# Patient Record
Sex: FEMALE | Race: WHITE | NOT HISPANIC OR LATINO | Employment: OTHER | URBAN - METROPOLITAN AREA
[De-identification: names, ages, dates, MRNs, and addresses within clinical notes are randomized per-mention and may not be internally consistent; named-entity substitution may affect disease eponyms.]

---

## 2023-04-21 LAB
ALANINE AMINOTRANSFERASE (SGPT) (U/L) IN SER/PLAS: 23 U/L (ref 7–45)
ALBUMIN (G/DL) IN SER/PLAS: 4.4 G/DL (ref 3.4–5)
ALKALINE PHOSPHATASE (U/L) IN SER/PLAS: 67 U/L (ref 33–136)
ANION GAP IN SER/PLAS: 12 MMOL/L (ref 10–20)
ASPARTATE AMINOTRANSFERASE (SGOT) (U/L) IN SER/PLAS: 21 U/L (ref 9–39)
BILIRUBIN TOTAL (MG/DL) IN SER/PLAS: 0.9 MG/DL (ref 0–1.2)
CALCIUM (MG/DL) IN SER/PLAS: 9.9 MG/DL (ref 8.6–10.6)
CARBON DIOXIDE, TOTAL (MMOL/L) IN SER/PLAS: 26 MMOL/L (ref 21–32)
CHLORIDE (MMOL/L) IN SER/PLAS: 106 MMOL/L (ref 98–107)
CREATININE (MG/DL) IN SER/PLAS: 0.91 MG/DL (ref 0.5–1.05)
ESTIMATED AVERAGE GLUCOSE FOR HBA1C: 143 MG/DL
GFR FEMALE: 64 ML/MIN/1.73M2
GLUCOSE (MG/DL) IN SER/PLAS: 152 MG/DL (ref 74–99)
HEMOGLOBIN A1C/HEMOGLOBIN TOTAL IN BLOOD: 6.6 %
POTASSIUM (MMOL/L) IN SER/PLAS: 4.6 MMOL/L (ref 3.5–5.3)
PROTEIN TOTAL: 7.2 G/DL (ref 6.4–8.2)
SODIUM (MMOL/L) IN SER/PLAS: 139 MMOL/L (ref 136–145)
UREA NITROGEN (MG/DL) IN SER/PLAS: 23 MG/DL (ref 6–23)

## 2023-09-07 LAB
ALANINE AMINOTRANSFERASE (SGPT) (U/L) IN SER/PLAS: 22 U/L (ref 7–45)
ALBUMIN (G/DL) IN SER/PLAS: 4.4 G/DL (ref 3.4–5)
ALKALINE PHOSPHATASE (U/L) IN SER/PLAS: 65 U/L (ref 33–136)
ANION GAP IN SER/PLAS: 13 MMOL/L (ref 10–20)
ASPARTATE AMINOTRANSFERASE (SGOT) (U/L) IN SER/PLAS: 20 U/L (ref 9–39)
BASOPHILS (10*3/UL) IN BLOOD BY AUTOMATED COUNT: 0.04 X10E9/L (ref 0–0.1)
BASOPHILS/100 LEUKOCYTES IN BLOOD BY AUTOMATED COUNT: 0.4 % (ref 0–2)
BILIRUBIN TOTAL (MG/DL) IN SER/PLAS: 0.9 MG/DL (ref 0–1.2)
CALCIUM (MG/DL) IN SER/PLAS: 9.9 MG/DL (ref 8.6–10.6)
CARBON DIOXIDE, TOTAL (MMOL/L) IN SER/PLAS: 26 MMOL/L (ref 21–32)
CHLORIDE (MMOL/L) IN SER/PLAS: 104 MMOL/L (ref 98–107)
CREATININE (MG/DL) IN SER/PLAS: 0.83 MG/DL (ref 0.5–1.05)
EOSINOPHILS (10*3/UL) IN BLOOD BY AUTOMATED COUNT: 0.23 X10E9/L (ref 0–0.4)
EOSINOPHILS/100 LEUKOCYTES IN BLOOD BY AUTOMATED COUNT: 2.3 % (ref 0–6)
ERYTHROCYTE DISTRIBUTION WIDTH (RATIO) BY AUTOMATED COUNT: 12.5 % (ref 11.5–14.5)
ERYTHROCYTE MEAN CORPUSCULAR HEMOGLOBIN CONCENTRATION (G/DL) BY AUTOMATED: 30.9 G/DL (ref 32–36)
ERYTHROCYTE MEAN CORPUSCULAR VOLUME (FL) BY AUTOMATED COUNT: 92 FL (ref 80–100)
ERYTHROCYTES (10*6/UL) IN BLOOD BY AUTOMATED COUNT: 4.9 X10E12/L (ref 4–5.2)
ESTIMATED AVERAGE GLUCOSE FOR HBA1C: 143 MG/DL
GFR FEMALE: 71 ML/MIN/1.73M2
GLUCOSE (MG/DL) IN SER/PLAS: 109 MG/DL (ref 74–99)
HEMATOCRIT (%) IN BLOOD BY AUTOMATED COUNT: 45 % (ref 36–46)
HEMOGLOBIN (G/DL) IN BLOOD: 13.9 G/DL (ref 12–16)
HEMOGLOBIN A1C/HEMOGLOBIN TOTAL IN BLOOD: 6.6 %
IMMATURE GRANULOCYTES/100 LEUKOCYTES IN BLOOD BY AUTOMATED COUNT: 0.3 % (ref 0–0.9)
LEUKOCYTES (10*3/UL) IN BLOOD BY AUTOMATED COUNT: 10 X10E9/L (ref 4.4–11.3)
LYMPHOCYTES (10*3/UL) IN BLOOD BY AUTOMATED COUNT: 2.1 X10E9/L (ref 0.8–3)
LYMPHOCYTES/100 LEUKOCYTES IN BLOOD BY AUTOMATED COUNT: 21 % (ref 13–44)
MONOCYTES (10*3/UL) IN BLOOD BY AUTOMATED COUNT: 0.77 X10E9/L (ref 0.05–0.8)
MONOCYTES/100 LEUKOCYTES IN BLOOD BY AUTOMATED COUNT: 7.7 % (ref 2–10)
NEUTROPHILS (10*3/UL) IN BLOOD BY AUTOMATED COUNT: 6.82 X10E9/L (ref 1.6–5.5)
NEUTROPHILS/100 LEUKOCYTES IN BLOOD BY AUTOMATED COUNT: 68.3 % (ref 40–80)
NRBC (PER 100 WBCS) BY AUTOMATED COUNT: 0 /100 WBC (ref 0–0)
PLATELETS (10*3/UL) IN BLOOD AUTOMATED COUNT: 261 X10E9/L (ref 150–450)
POTASSIUM (MMOL/L) IN SER/PLAS: 4.3 MMOL/L (ref 3.5–5.3)
PROTEIN TOTAL: 7.5 G/DL (ref 6.4–8.2)
SODIUM (MMOL/L) IN SER/PLAS: 139 MMOL/L (ref 136–145)
THYROTROPIN (MIU/L) IN SER/PLAS BY DETECTION LIMIT <= 0.05 MIU/L: 8.49 MIU/L (ref 0.44–3.98)
THYROXINE (T4) FREE (NG/DL) IN SER/PLAS: 0.75 NG/DL (ref 0.78–1.48)
UREA NITROGEN (MG/DL) IN SER/PLAS: 19 MG/DL (ref 6–23)

## 2023-09-11 PROBLEM — R93.89 ABNORMAL FINDING ON IMAGING: Status: ACTIVE | Noted: 2023-09-11

## 2023-09-11 PROBLEM — L82.0 INFLAMED SEBORRHEIC KERATOSIS: Status: ACTIVE | Noted: 2022-12-08

## 2023-09-11 PROBLEM — L98.9 CHANGING SKIN LESION: Status: ACTIVE | Noted: 2023-09-11

## 2023-09-11 PROBLEM — E78.00 ELEVATED LDL CHOLESTEROL LEVEL: Status: ACTIVE | Noted: 2023-09-11

## 2023-09-11 PROBLEM — D22.39 MELANOCYTIC NEVI OF OTHER PARTS OF FACE: Status: ACTIVE | Noted: 2022-12-08

## 2023-09-11 PROBLEM — I10 HYPERTENSION: Status: ACTIVE | Noted: 2023-09-11

## 2023-09-11 PROBLEM — R42 WEAKNESS WITH DIZZINESS: Status: ACTIVE | Noted: 2023-09-11

## 2023-09-11 PROBLEM — R09.89 CAROTID ARTERY BRUIT: Status: ACTIVE | Noted: 2023-09-11

## 2023-09-11 PROBLEM — N95.1 POSTMENOPAUSAL DISORDER: Status: ACTIVE | Noted: 2023-09-11

## 2023-09-11 PROBLEM — R00.1 BRADYCARDIA: Status: ACTIVE | Noted: 2023-09-11

## 2023-09-11 PROBLEM — L60.8 CHANGE IN NAIL APPEARANCE: Status: ACTIVE | Noted: 2023-09-11

## 2023-09-11 PROBLEM — R53.1 WEAKNESS WITH DIZZINESS: Status: ACTIVE | Noted: 2023-09-11

## 2023-09-11 PROBLEM — R79.9 ABNORMAL BLOOD CHEMISTRY: Status: ACTIVE | Noted: 2023-09-11

## 2023-09-11 PROBLEM — E03.9 HYPOTHYROIDISM: Status: ACTIVE | Noted: 2023-09-11

## 2023-09-11 PROBLEM — R42 DIZZINESS: Status: ACTIVE | Noted: 2023-09-11

## 2023-09-11 PROBLEM — I65.22 ASYMPTOMATIC STENOSIS OF LEFT CAROTID ARTERY WITHOUT INFARCTION: Status: ACTIVE | Noted: 2023-09-11

## 2023-09-11 PROBLEM — D48.5 NEOPLASM OF UNCERTAIN BEHAVIOR OF SKIN: Status: ACTIVE | Noted: 2022-12-08

## 2023-09-11 PROBLEM — D18.01 HEMANGIOMA OF SKIN AND SUBCUTANEOUS TISSUE: Status: ACTIVE | Noted: 2022-12-08

## 2023-09-11 PROBLEM — R20.2 HAND TINGLING: Status: ACTIVE | Noted: 2023-09-11

## 2023-09-11 PROBLEM — D22.5 MELANOCYTIC NEVI OF TRUNK: Status: ACTIVE | Noted: 2022-12-08

## 2023-09-11 PROBLEM — L72.9 SKIN CYST: Status: ACTIVE | Noted: 2023-09-11

## 2023-09-11 PROBLEM — J32.9 SINUSITIS: Status: ACTIVE | Noted: 2023-09-11

## 2023-09-11 PROBLEM — R92.8 ABNORMAL MAMMOGRAM: Status: ACTIVE | Noted: 2023-09-11

## 2023-09-11 PROBLEM — E66.3 OVERWEIGHT WITH BODY MASS INDEX (BMI) OF 25 TO 25.9 IN ADULT: Status: ACTIVE | Noted: 2023-09-11

## 2023-09-11 PROBLEM — D22.4 MELANOCYTIC NEVI OF SCALP AND NECK: Status: ACTIVE | Noted: 2022-12-08

## 2023-09-11 PROBLEM — E55.9 VITAMIN D DEFICIENCY: Status: ACTIVE | Noted: 2023-09-11

## 2023-09-11 PROBLEM — Z78.0 ASYMPTOMATIC MENOPAUSAL STATE: Status: ACTIVE | Noted: 2023-09-11

## 2023-09-11 PROBLEM — L81.4 OTHER MELANIN HYPERPIGMENTATION: Status: ACTIVE | Noted: 2022-12-08

## 2023-09-11 PROBLEM — E05.90 HYPERTHYROIDISM: Status: ACTIVE | Noted: 2023-09-11

## 2023-09-11 PROBLEM — L82.1 OTHER SEBORRHEIC KERATOSIS: Status: ACTIVE | Noted: 2022-12-08

## 2023-09-11 RX ORDER — ROSUVASTATIN CALCIUM 20 MG/1
1 TABLET, COATED ORAL DAILY
COMMUNITY
Start: 2020-07-13 | End: 2024-05-01

## 2023-09-11 RX ORDER — LOSARTAN POTASSIUM 100 MG/1
1 TABLET ORAL DAILY
COMMUNITY
Start: 2019-11-18 | End: 2024-05-01

## 2023-09-11 RX ORDER — AMLODIPINE BESYLATE 5 MG/1
1 TABLET ORAL DAILY
COMMUNITY
Start: 2019-09-09 | End: 2024-05-01

## 2023-09-11 RX ORDER — THYROID 60 MG/1
1 TABLET ORAL DAILY
COMMUNITY
Start: 2013-01-17 | End: 2024-05-03

## 2023-09-11 RX ORDER — NAPROXEN SODIUM 220 MG/1
1 TABLET, FILM COATED ORAL EVERY OTHER DAY
COMMUNITY
Start: 2020-07-13

## 2023-09-11 RX ORDER — ERGOCALCIFEROL 1.25 MG/1
1 CAPSULE ORAL
COMMUNITY
Start: 2021-01-08 | End: 2024-05-06

## 2023-09-11 RX ORDER — ASCORBIC ACID 500 MG
TABLET,CHEWABLE ORAL
COMMUNITY
Start: 2022-09-15

## 2023-09-11 RX ORDER — HYDROCHLOROTHIAZIDE 25 MG/1
1 TABLET ORAL DAILY
COMMUNITY
Start: 2019-11-18 | End: 2024-05-01

## 2023-10-23 ENCOUNTER — HOSPITAL ENCOUNTER (OUTPATIENT)
Dept: VASCULAR MEDICINE | Facility: HOSPITAL | Age: 78
Discharge: HOME | End: 2023-10-23
Payer: MEDICARE

## 2023-10-23 DIAGNOSIS — R09.89 OTHER SPECIFIED SYMPTOMS AND SIGNS INVOLVING THE CIRCULATORY AND RESPIRATORY SYSTEMS: ICD-10-CM

## 2023-10-23 PROCEDURE — 93880 EXTRACRANIAL BILAT STUDY: CPT

## 2023-10-23 PROCEDURE — 93880 EXTRACRANIAL BILAT STUDY: CPT | Performed by: INTERNAL MEDICINE

## 2023-10-27 ENCOUNTER — APPOINTMENT (OUTPATIENT)
Dept: DERMATOLOGY | Facility: CLINIC | Age: 78
End: 2023-10-27
Payer: MEDICARE

## 2023-11-01 NOTE — PROGRESS NOTES
"Subjective   HPI: Alda Avila is a 78 y.o. female is here for evaluation and treatment of a skin lesion on the ulnar side of the right wrist as well as a pruritic lesion on the chest.  She was last seen by Dr. Brandt on 12/8/2022.  Patient states she had a cystic-like lesion on the right side of her wrist for about 6 months.  The area did not go away.  She went and saw her PCP who said it may have been a cyst and so they tried draining it.  The cyst came back and the patient was referred to orthopedic surgery.  The orthopedic surgeon said it was likely a hair follicle as it did not show up on x-ray and referred her back to dermatology.  Patient says the orthopedic surgeon pushed on the area and the area actually popped and went away.  Now she is left with a hyperpigmented 2 mm lesion and wants to know what it was.  The lesion on her chest has been present for about a month and has been pruritic causing her to itch which then causes the area to bleed.    ROS: No other skin or systemic complaints other than what is documented elsewhere in the note.    ALLERGIES: Patient has no known allergies.    SOCIAL:  has no history on file for tobacco use, alcohol use, and drug use.    Objective   Right Wrist - ulnar side  2 mm hyperpigmented area on the ulnar side of the wrist    Right Breast  Pink and brown \"stuck on\" verrucous scaly papule with surrounding erythema and bloody crust.        Assessment/Plan   1. Epidermal cyst  Right Wrist - ulnar side    Punch biopsy to rule out foreign body versus epidermal inclusion cyst.  Discussed with patient patient is agreeable.    Skin biopsy - Right Wrist - ulnar side    Specimen 1 - Dermatopathology- DERM LAB  Differential Diagnosis: epidermal cyst vs foreign body  Check Margins Yes/No?:  yes  Comments:    Dermpath Lab: Routine Histopathology (formalin-fixed tissue)    2. Seborrheic keratosis  Right Breast    Discussed benign nature of seborrheic keratosis with patient since " the area is pruritic and inflamed discussed with patient option to remove.  Patient would like removal at this time.    Skin biopsy - Right Breast    Specimen 2 - Dermatopathology- DERM LAB  Differential Diagnosis: isk vs scc  Check Margins Yes/No?:  no  Comments:    Dermpath Lab: Routine Histopathology (formalin-fixed tissue)         FOLLOW UP: 7 days suture removal    The patient was encouraged to contact me with any further questions or concerns.  Muriel Cuevas PA-C  11/6/2023

## 2023-11-06 ENCOUNTER — OFFICE VISIT (OUTPATIENT)
Dept: DERMATOLOGY | Facility: CLINIC | Age: 78
End: 2023-11-06
Payer: MEDICARE

## 2023-11-06 DIAGNOSIS — L72.0 EPIDERMAL CYST: Primary | ICD-10-CM

## 2023-11-06 DIAGNOSIS — L82.1 SEBORRHEIC KERATOSIS: ICD-10-CM

## 2023-11-06 PROCEDURE — 1126F AMNT PAIN NOTED NONE PRSNT: CPT

## 2023-11-06 PROCEDURE — 99214 OFFICE O/P EST MOD 30 MIN: CPT

## 2023-11-06 PROCEDURE — 88305 TISSUE EXAM BY PATHOLOGIST: CPT | Mod: TC,DER

## 2023-11-06 PROCEDURE — 3078F DIAST BP <80 MM HG: CPT

## 2023-11-06 PROCEDURE — 11104 PUNCH BX SKIN SINGLE LESION: CPT

## 2023-11-06 PROCEDURE — 11301 SHAVE SKIN LESION 0.6-1.0 CM: CPT

## 2023-11-06 PROCEDURE — 3074F SYST BP LT 130 MM HG: CPT

## 2023-11-06 PROCEDURE — 88305 TISSUE EXAM BY PATHOLOGIST: CPT | Performed by: DERMATOLOGY

## 2023-11-07 ENCOUNTER — OFFICE VISIT (OUTPATIENT)
Dept: VASCULAR SURGERY | Facility: CLINIC | Age: 78
End: 2023-11-07
Payer: MEDICARE

## 2023-11-07 VITALS
HEART RATE: 65 BPM | BODY MASS INDEX: 24.98 KG/M2 | DIASTOLIC BLOOD PRESSURE: 82 MMHG | WEIGHT: 141 LBS | SYSTOLIC BLOOD PRESSURE: 163 MMHG

## 2023-11-07 DIAGNOSIS — I65.23 BILATERAL CAROTID ARTERY STENOSIS: Primary | ICD-10-CM

## 2023-11-07 PROCEDURE — 1126F AMNT PAIN NOTED NONE PRSNT: CPT | Performed by: SURGERY

## 2023-11-07 PROCEDURE — 1036F TOBACCO NON-USER: CPT | Performed by: SURGERY

## 2023-11-07 PROCEDURE — 99213 OFFICE O/P EST LOW 20 MIN: CPT | Performed by: SURGERY

## 2023-11-07 PROCEDURE — 1159F MED LIST DOCD IN RCRD: CPT | Performed by: SURGERY

## 2023-11-07 PROCEDURE — 3077F SYST BP >= 140 MM HG: CPT | Performed by: SURGERY

## 2023-11-07 PROCEDURE — 3079F DIAST BP 80-89 MM HG: CPT | Performed by: SURGERY

## 2023-11-07 ASSESSMENT — ENCOUNTER SYMPTOMS
RESPIRATORY NEGATIVE: 1
GASTROINTESTINAL NEGATIVE: 1
PSYCHIATRIC NEGATIVE: 1
MUSCULOSKELETAL NEGATIVE: 1
HEMATOLOGIC/LYMPHATIC NEGATIVE: 1
CARDIOVASCULAR NEGATIVE: 1
EYES NEGATIVE: 1
NEUROLOGICAL NEGATIVE: 1
ENDOCRINE NEGATIVE: 1
ALLERGIC/IMMUNOLOGIC NEGATIVE: 1
CONSTITUTIONAL NEGATIVE: 1

## 2023-11-07 NOTE — PROGRESS NOTES
Subjective   Patient ID: Alda Avila is a 78 y.o. female who presents for Follow-up (1 yr Carotid Bruit).  HPI  78 year old female with a past medical history of carotid artery stenosis, HTN and obesity that presents to the office for a yearly follow up for carotid artery stenosis She remains asymptomatic, she denies dizziness, vision changes or limb weakness. She is on ASA and a statin.She denies smoking. She states that her mother had a history of carotid artery surgeries.    Vascular testing:  10/23: Carotid artery ultrasound: Findings are consistent with less than 50% stenosis of the right proximal internal carotid artery. Findings are consistent with less than 50% stenosis of the left proximal internal carotid artery.    2/22: Carotid ultrasound: Left Carotid: Findings are consistent with greater than 70% stenosis of the left promal ICA.  July/21: Right Carotid: Findings are consistent with less than 50% stenosis of the right proximal ICA.  Left Carotid: Findings are consistent with greater than 70% stenosis of the left proximal ICA.    CT angio neck July/2021:Estimated 60-65% stenosis of the left internal carotid artery origin secondary to calcified and noncalcified atheromatous plaque.Estimated 30-35% stenosis of the right internal carotid artery origin.    Review of Systems   Constitutional: Negative.    HENT: Negative.     Eyes: Negative.    Respiratory: Negative.     Cardiovascular: Negative.    Gastrointestinal: Negative.    Endocrine: Negative.    Genitourinary: Negative.    Musculoskeletal: Negative.    Skin: Negative.    Allergic/Immunologic: Negative.    Neurological: Negative.    Hematological: Negative.    Psychiatric/Behavioral: Negative.         Objective   Physical Exam  Eyes:      Pupils: Pupils are equal, round, and reactive to light.   Cardiovascular:      Rate and Rhythm: Normal rate.   Pulmonary:      Effort: Pulmonary effort is normal.   Abdominal:      General: Bowel sounds are  normal.   Musculoskeletal:         General: Normal range of motion.      Cervical back: Normal range of motion.   Skin:     General: Skin is warm and dry.      Capillary Refill: Capillary refill takes less than 2 seconds.   Neurological:      General: No focal deficit present.      Mental Status: She is alert.   Psychiatric:         Mood and Affect: Mood normal.         Assessment/Plan   78 year old female with a past medical history of carotid artery stenosis, HTN and obesity that presents to the office for a yearly follow up for carotid artery stenosis     Plan:  Carotid ultrasound shows less than 50%.  Continue ASA and statin  Dicussed stroke symptoms and when to seek medical attention  Follow up in 6 mths- will repeat carotid duplex at that time

## 2023-11-08 LAB
LABORATORY COMMENT REPORT: NORMAL
PATH REPORT.FINAL DX SPEC: NORMAL
PATH REPORT.GROSS SPEC: NORMAL
PATH REPORT.MICROSCOPIC SPEC OTHER STN: NORMAL
PATH REPORT.RELEVANT HX SPEC: NORMAL
PATH REPORT.TOTAL CANCER: NORMAL

## 2023-11-13 ENCOUNTER — OFFICE VISIT (OUTPATIENT)
Dept: DERMATOLOGY | Facility: CLINIC | Age: 78
End: 2023-11-13
Payer: MEDICARE

## 2023-11-13 DIAGNOSIS — L82.1 OTHER SEBORRHEIC KERATOSIS: ICD-10-CM

## 2023-11-13 DIAGNOSIS — Z48.02 ENCOUNTER FOR REMOVAL OF SUTURES: Primary | ICD-10-CM

## 2023-11-13 PROCEDURE — 1159F MED LIST DOCD IN RCRD: CPT

## 2023-11-13 PROCEDURE — 3077F SYST BP >= 140 MM HG: CPT

## 2023-11-13 PROCEDURE — 1036F TOBACCO NON-USER: CPT

## 2023-11-13 PROCEDURE — 3079F DIAST BP 80-89 MM HG: CPT

## 2023-11-13 PROCEDURE — 99024 POSTOP FOLLOW-UP VISIT: CPT

## 2023-11-13 PROCEDURE — 1126F AMNT PAIN NOTED NONE PRSNT: CPT

## 2023-11-13 NOTE — PROGRESS NOTES
Subjective   HPI: Alda Avila is a 78 y.o. female is here for suture removal and dermatopathology results from 11/6/2023 where a punch biopsy was performed on the right wrist ulnar side.  Dermatopathology accession ID [id number].  Patient has no concerns at this time.    ROS: No other skin or systemic complaints other than what is documented elsewhere in the note.    ALLERGIES: Patient has no known allergies.    SOCIAL:  reports that she quit smoking about 20 years ago. Her smoking use included cigarettes. She has never been exposed to tobacco smoke. She has never used smokeless tobacco. No history on file for alcohol use and drug use.    Objective   Right Wrist - Anterior  The area has healed nicely.    [id number]      Assessment/Plan   1. Encounter for removal of sutures  Right Wrist - Anterior    A Steri-Strip was placed.  Discussed dermatopathology results with patient.    2. Other seborrheic keratosis  Right Breast         FOLLOW UP: As needed    The patient was encouraged to contact me with any further questions or concerns.  Muriel Cuevas PA-C  11/13/2023

## 2023-11-13 NOTE — PROGRESS NOTES
Subjective     Alda Avila is a 78 y.o. female who presents for the following: bx results ([id number] and Suture Removal /).     Review of Systems:  No other skin or systemic complaints other than what is documented elsewhere in the note.    The following portions of the chart were reviewed this encounter and updated as appropriate:          Skin Cancer History  No skin cancer on file.      Specialty Problems          Dermatology Problems    Hemangioma of skin and subcutaneous tissue    Inflamed seborrheic keratosis    Melanocytic nevi of other parts of face    Melanocytic nevi of scalp and neck    Melanocytic nevi of trunk    Neoplasm of uncertain behavior of skin    Other melanin hyperpigmentation    Other seborrheic keratosis    Change in nail appearance    Changing skin lesion    Skin cyst        Objective   Well appearing patient in no apparent distress; mood and affect are within normal limits.    A focused skin examination was performed. All findings within normal limits unless otherwise noted below.    Assessment/Plan   1. Other seborrheic keratosis  Right Breast

## 2023-12-21 ENCOUNTER — HOSPITAL ENCOUNTER (OUTPATIENT)
Dept: RADIOLOGY | Facility: HOSPITAL | Age: 78
Discharge: HOME | End: 2023-12-21
Payer: MEDICARE

## 2023-12-21 DIAGNOSIS — Z78.0 ASYMPTOMATIC MENOPAUSAL STATE: ICD-10-CM

## 2023-12-21 PROCEDURE — 77085 DXA BONE DENSITY AXL VRT FX: CPT

## 2023-12-21 PROCEDURE — 77085 DXA BONE DENSITY AXL VRT FX: CPT | Performed by: STUDENT IN AN ORGANIZED HEALTH CARE EDUCATION/TRAINING PROGRAM

## 2023-12-22 ENCOUNTER — TELEPHONE (OUTPATIENT)
Dept: PRIMARY CARE | Facility: CLINIC | Age: 78
End: 2023-12-22
Payer: MEDICARE

## 2023-12-22 NOTE — TELEPHONE ENCOUNTER
Called Pt several times.  No answer,  will keep trying.  Lg      ----- Message from Alistair Faith MD sent at 12/21/2023  6:16 PM EST -----  Pls let pt know I would like to consult rheumatology for the thinning of the bone Osteopenia .  Take it vitamin D and calcium ty aq

## 2023-12-22 NOTE — TELEPHONE ENCOUNTER
Called Pt several times, no answer.  Will keep trying.  Lg      ----- Message from Alistair Faith MD sent at 12/21/2023  6:16 PM EST -----  Pls let pt know I would like to consult rheumatology for the thinning of the bone Osteopenia .  Take it vitamin D and calcium ty aq

## 2023-12-26 ENCOUNTER — TELEPHONE (OUTPATIENT)
Dept: PRIMARY CARE | Facility: CLINIC | Age: 78
End: 2023-12-26
Payer: MEDICARE

## 2023-12-26 DIAGNOSIS — M85.80 OSTEOPENIA, UNSPECIFIED LOCATION: ICD-10-CM

## 2023-12-26 NOTE — TELEPHONE ENCOUNTER
Called pt and relayed info.  She is open to see rheumatology.  She is ok with Dr. Cool.  She will call back to make appt.  Please put in referral to rheumatology.  LG    ----- Message from Alistair Faith MD sent at 12/21/2023  6:16 PM EST -----  Pls let pt know I would like to consult rheumatology for the thinning of the bone Osteopenia .  Take it vitamin D and calcium ty aq

## 2024-03-01 ENCOUNTER — OFFICE VISIT (OUTPATIENT)
Dept: RHEUMATOLOGY | Facility: CLINIC | Age: 79
End: 2024-03-01
Payer: MEDICARE

## 2024-03-01 ENCOUNTER — LAB (OUTPATIENT)
Dept: LAB | Facility: LAB | Age: 79
End: 2024-03-01
Payer: MEDICARE

## 2024-03-01 VITALS — BODY MASS INDEX: 25.69 KG/M2 | SYSTOLIC BLOOD PRESSURE: 148 MMHG | WEIGHT: 145 LBS | DIASTOLIC BLOOD PRESSURE: 78 MMHG

## 2024-03-01 DIAGNOSIS — M85.80 OSTEOPENIA, UNSPECIFIED LOCATION: Primary | ICD-10-CM

## 2024-03-01 DIAGNOSIS — M85.80 OSTEOPENIA, UNSPECIFIED LOCATION: ICD-10-CM

## 2024-03-01 LAB
25(OH)D3 SERPL-MCNC: 56 NG/ML (ref 30–100)
CALCIUM SERPL-MCNC: 10.1 MG/DL (ref 8.6–10.6)
MAGNESIUM SERPL-MCNC: 2.01 MG/DL (ref 1.6–2.4)
PHOSPHATE SERPL-MCNC: 3 MG/DL (ref 2.5–4.9)
PTH-INTACT SERPL-MCNC: 39.2 PG/ML (ref 18.5–88)

## 2024-03-01 PROCEDURE — 1159F MED LIST DOCD IN RCRD: CPT | Performed by: INTERNAL MEDICINE

## 2024-03-01 PROCEDURE — 1126F AMNT PAIN NOTED NONE PRSNT: CPT | Performed by: INTERNAL MEDICINE

## 2024-03-01 PROCEDURE — 1036F TOBACCO NON-USER: CPT | Performed by: INTERNAL MEDICINE

## 2024-03-01 PROCEDURE — 82310 ASSAY OF CALCIUM: CPT

## 2024-03-01 PROCEDURE — 83735 ASSAY OF MAGNESIUM: CPT

## 2024-03-01 PROCEDURE — 99214 OFFICE O/P EST MOD 30 MIN: CPT | Performed by: INTERNAL MEDICINE

## 2024-03-01 PROCEDURE — 83970 ASSAY OF PARATHORMONE: CPT

## 2024-03-01 PROCEDURE — 82523 COLLAGEN CROSSLINKS: CPT

## 2024-03-01 PROCEDURE — 3077F SYST BP >= 140 MM HG: CPT | Performed by: INTERNAL MEDICINE

## 2024-03-01 PROCEDURE — 84100 ASSAY OF PHOSPHORUS: CPT

## 2024-03-01 PROCEDURE — 82306 VITAMIN D 25 HYDROXY: CPT

## 2024-03-01 PROCEDURE — 36415 COLL VENOUS BLD VENIPUNCTURE: CPT

## 2024-03-01 PROCEDURE — 3078F DIAST BP <80 MM HG: CPT | Performed by: INTERNAL MEDICINE

## 2024-03-01 RX ORDER — ALENDRONATE SODIUM 70 MG/1
70 TABLET ORAL
Qty: 12 TABLET | Refills: 1 | Status: SHIPPED | OUTPATIENT
Start: 2024-03-01 | End: 2024-05-10 | Stop reason: ALTCHOICE

## 2024-03-01 NOTE — PATIENT INSTRUCTIONS
Take alendronate once a week.  Continue calcium and vitamin D.  Continue weightbearing exercises.  Call if any question.  Follow-up in 6 months or sooner if needed.

## 2024-03-01 NOTE — PROGRESS NOTES
Subjective . Alda Avila is a 78 y.o. female who presents for Follow-up and Arthritis.    78-year-old female with history of HTN, hypothyroidism carotid artery stenosis referred by primary care physician Dr.Ma Jorge Faith MD     She underwent menopause in her 50s.  He has no personal history of fragility fracture.  Family history of osteoporosis is abnormal.  She takes vitamin D regularly.  She does not smoke or drink.  She exercises regularly.      DEXA scan obtained in December 2023 showed L1-L4 bone density of 0.898 g/cm² reflecting T-score of -1.4 with a decline of 3.6%, left total femur T-score of -0.7 and left femoral neck bone density of 0.643 g/cm² reflecting T-score of -1.9.    FRAX score risk 22% for major osteoporotic fracture and 5.4% for hip fracture.    Social history: She is .  She does not smoke or drink.    Review of Systems   All other systems reviewed and are negative.    Objective .   Blood pressure 148/78, weight 65.8 kg (145 lb).    Physical Exam.  Gen. AAO x3, NAD.  HEENT: No pallor or icterus, PERRLA, EOMI. Oropharynx is clear. MM moist,Parotid glands  not enlarged. No cervical lymphadenopathy .  Skin: No rashes.  Heart: S1, S2/ RRR.   Lungs: CTA B.  Abdomen: Soft, NT/ND, BS regular.  MSK: No.swelling or tenderness of the  upper or lower extremity joints with full ROM, Neck,spine and Alexey SI with out tenderness.  Neuro: CN II-XII intact. Sensation to touch intact.Strength 5/5 throughout. DTR 2+ and symmetrical.  Psych:Appropriate mood and behavior  EXT: No edema      Assessment/Plan . 78-year-old female with history of HTN, hypothyroidism carotid artery stenosis referred by primary care physician Dr.Ma Jorge Faith MD .      1: Osteopenia with elevated FRAX score.  No personal history of fragility fractures.  -Bone densitometry results reviewed with the patient.  -Begin alendronate once a week.  Side effect discussed.  -Continue calcium and vitamin  D.  -Continue weightbearing exercises.  -Fall precautions discussed.  -Labs.    Follow-up in 6 months.     This note was partially generated using the Dragon Voice recognition system. There may be some incorrect wording, spelling and/or spelling errors or punctuation errors that were not corrected prior to committing the note to the medical record.          Problem List Items Addressed This Visit    None  Visit Diagnoses       Osteopenia, unspecified location    -  Primary    Relevant Medications    alendronate (Fosamax) 70 mg tablet    Other Relevant Orders    N-Telopeptide, Serum    Magnesium    Phosphorus    Calcium    Parathyroid Hormone, Intact    Vitamin D 25-Hydroxy,Total (for eval of Vitamin D levels)            Active Ambulatory Problems     Diagnosis Date Noted    Abnormal blood chemistry 09/11/2023    Abnormal finding on imaging 09/11/2023    Abnormal mammogram 09/11/2023    Asymptomatic stenosis of left carotid artery without infarction 09/11/2023    Bradycardia 09/11/2023    Carotid artery bruit 09/11/2023    Changing skin lesion 09/11/2023    Dizziness 09/11/2023    Elevated LDL cholesterol level 09/11/2023    Hemangioma of skin and subcutaneous tissue 12/08/2022    Hypertension 09/11/2023    Hyperthyroidism 09/11/2023    Hypothyroidism 09/11/2023    Inflamed seborrheic keratosis 12/08/2022    Other seborrheic keratosis 12/08/2022    Melanocytic nevi of scalp and neck 12/08/2022    Melanocytic nevi of trunk 12/08/2022    Melanocytic nevi of other parts of face 12/08/2022    Neoplasm of uncertain behavior of skin 12/08/2022    Other melanin hyperpigmentation 12/08/2022    Postmenopausal disorder 09/11/2023    Sinusitis 09/11/2023    Vitamin D deficiency 09/11/2023    Weakness with dizziness 09/11/2023    Asymptomatic menopausal state 09/11/2023    Body mass index (BMI) 24.0-24.9, adult 09/11/2023    Change in nail appearance 09/11/2023    Hand tingling 09/11/2023    Overweight with body mass index (BMI)  of 25 to 25.9 in adult 2023    Skin cyst 2023     Resolved Ambulatory Problems     Diagnosis Date Noted    No Resolved Ambulatory Problems     Past Medical History:   Diagnosis Date    Abnormal findings on diagnostic imaging of other specified body structures 2020    Bradycardia, unspecified 2019    Dizziness and giddiness 10/04/2019    Encounter for immunization 2022    Encounter for other screening for malignant neoplasm of breast 10/22/2021    Encounter for screening for malignant neoplasm of cervix 10/07/2015    Encounter for screening for malignant neoplasm of colon 2021    Menopausal and female climacteric states 10/07/2015    Other abnormal and inconclusive findings on diagnostic imaging of breast 11/10/2021       Family History   Problem Relation Name Age of Onset    Hypertension Mother      Hyperlipidemia Mother      Lung cancer Father      Other (smoker) Father      Other (son 41) Son x2         in april in [place]    Other (son 57) Son x2     Hypertension Maternal Grandfather      Other (36 lives in [place] for 7 years) Other Child        Past Surgical History:   Procedure Laterality Date    CATARACT EXTRACTION  2015    Cataract Surgery    CT ANGIO NECK  2021    CT NECK ANGIO W AND WO IV CONTRAST 2021 [facility]    OTHER SURGICAL HISTORY  2022    Colonoscopy       Social History     Tobacco Use   Smoking Status Former    Types: Cigarettes    Quit date:     Years since quittin.1    Passive exposure: Never   Smokeless Tobacco Never       Allergies  Patient has no known allergies.    Current Meds  Current Outpatient Medications   Medication Instructions    alendronate (FOSAMAX) 70 mg, oral, Every 7 days, Take in the morning with a full glass of water, on an empty stomach, and do not take anything else by mouth or lie down for the next 30 min.    amLODIPine (Norvasc) 5 mg tablet 1 tablet, oral, Daily    ascorbic acid (Strawberry  C) 500 mg chewable tablet oral, TAKE AS DIRECTED.    aspirin 81 mg chewable tablet 1 tablet, oral, Every other day    CALCIUM ORAL oral, Calcium 280 MG Oral Tablet, TAKE AS DIRECTED.    ergocalciferol (Vitamin D-2) 1.25 MG (39666 UT) capsule 1 capsule, oral, Every 14 days    hydroCHLOROthiazide (HYDRODiuril) 25 mg tablet 1 tablet, oral, Daily    losartan (Cozaar) 100 mg tablet 1 tablet, oral, Daily    NON FORMULARY VITAMIN C ORAL tablet chewable, Take as directed    rosuvastatin (Crestor) 20 mg tablet 1 tablet, oral, Daily    thyroid, pork, ([place] Thyroid) 60 mg tablet 1 tablet, oral, Daily        Lab Results   Component Value Date    CKTOTAL 121 01/05/2021        Jamar Cool MD

## 2024-03-06 LAB — COLLAGEN NTX SER-SCNC: 19.5 NM BCE

## 2024-04-08 ENCOUNTER — APPOINTMENT (OUTPATIENT)
Dept: PRIMARY CARE | Facility: CLINIC | Age: 79
End: 2024-04-08
Payer: MEDICARE

## 2024-05-01 DIAGNOSIS — R93.89 ABNORMAL FINDINGS ON DIAGNOSTIC IMAGING OF OTHER SPECIFIED BODY STRUCTURES: ICD-10-CM

## 2024-05-01 DIAGNOSIS — I10 ESSENTIAL (PRIMARY) HYPERTENSION: ICD-10-CM

## 2024-05-01 RX ORDER — ROSUVASTATIN CALCIUM 20 MG/1
20 TABLET, COATED ORAL DAILY
Qty: 90 TABLET | Refills: 1 | Status: SHIPPED | OUTPATIENT
Start: 2024-05-01

## 2024-05-01 RX ORDER — HYDROCHLOROTHIAZIDE 25 MG/1
25 TABLET ORAL DAILY
Qty: 90 TABLET | Refills: 3 | Status: SHIPPED | OUTPATIENT
Start: 2024-05-01

## 2024-05-01 RX ORDER — LOSARTAN POTASSIUM 100 MG/1
100 TABLET ORAL DAILY
Qty: 90 TABLET | Refills: 1 | Status: SHIPPED | OUTPATIENT
Start: 2024-05-01

## 2024-05-01 RX ORDER — AMLODIPINE BESYLATE 5 MG/1
5 TABLET ORAL DAILY
Qty: 90 TABLET | Refills: 1 | Status: SHIPPED | OUTPATIENT
Start: 2024-05-01

## 2024-05-03 DIAGNOSIS — E05.90 THYROTOXICOSIS, UNSPECIFIED WITHOUT THYROTOXIC CRISIS OR STORM: ICD-10-CM

## 2024-05-03 RX ORDER — SULFAMETHOXAZOLE AND TRIMETHOPRIM 800; 160 MG/1; MG/1
60 TABLET ORAL DAILY
Qty: 90 TABLET | Refills: 1 | Status: SHIPPED | OUTPATIENT
Start: 2024-05-03

## 2024-05-06 DIAGNOSIS — E55.9 VITAMIN D DEFICIENCY, UNSPECIFIED: ICD-10-CM

## 2024-05-06 RX ORDER — ERGOCALCIFEROL 1.25 MG/1
1 CAPSULE ORAL
Qty: 6 CAPSULE | Refills: 1 | Status: SHIPPED | OUTPATIENT
Start: 2024-05-06

## 2024-05-07 ENCOUNTER — HOSPITAL ENCOUNTER (OUTPATIENT)
Dept: VASCULAR MEDICINE | Facility: HOSPITAL | Age: 79
Discharge: HOME | End: 2024-05-07
Payer: MEDICARE

## 2024-05-07 DIAGNOSIS — R09.89 OTHER SPECIFIED SYMPTOMS AND SIGNS INVOLVING THE CIRCULATORY AND RESPIRATORY SYSTEMS: ICD-10-CM

## 2024-05-07 DIAGNOSIS — I65.23 BILATERAL CAROTID ARTERY STENOSIS: ICD-10-CM

## 2024-05-07 PROCEDURE — 93880 EXTRACRANIAL BILAT STUDY: CPT | Performed by: SURGERY

## 2024-05-07 PROCEDURE — 93880 EXTRACRANIAL BILAT STUDY: CPT

## 2024-05-09 ENCOUNTER — TELEPHONE (OUTPATIENT)
Dept: VASCULAR SURGERY | Facility: CLINIC | Age: 79
End: 2024-05-09
Payer: MEDICARE

## 2024-05-09 NOTE — TELEPHONE ENCOUNTER
Result Communication    Resulted Orders   Vascular US carotid artery duplex bilateral    [facility]  [address] [id number]       Phone [phone] Fax [phone]       Vascular Lab Report     [facility] US CAROTID ARTERY DUPLEX BILATERAL    Patient Name:     LIBRA SPEARS NORTH  Reading Physician: [id number] Avis Anne MD  Study Date:       5/7/2024            Ordering Provider: [id number] MARY JAMES  MRN/PID:          [id number]            Fellow:  Accession#:       [id number]        Technologist:      Addie Willams RVT  Date of           1945 / 78      Technologist 2:  Birth/Age:        years  Gender:           F                   Encounter#:        [id number]  Admission Status: Outpatient          Location           [facility]                                        Performed:       Diagnosis/ICD: Other specified symptoms and signs involving the circulatory and                 respiratory systems-R09.89  CPT Codes:     21012 Cerebrovascular Carotid Duplex scan complete       CONCLUSIONS:  Right Carotid: Findings are consistent with less than 50% stenosis of the right proximal internal carotid artery. Laminar flow seen by color Doppler. Right external carotid artery appears patent with no evidence of stenosis. The right vertebral artery is patent with antegrade flow. No evidence of hemodynamically significant stenosis in the right subclavian artery.  Left Carotid: Findings are consistent with less than 50% stenosis of the left proximal internal carotid artery. Laminar flow seen by color Doppler. Left external carotid artery appears patent with no evidence of stenosis. The left vertebral artery is patent with antegrade flow. No evidence of hemodynamically significant stenosis in the left subclavian artery.     Comparison:  Compared with study from 10/23/2023, no significant change.      Imaging & Doppler Findings:  Right Plaque Morph: The proximal right internal carotid artery demonstrates calcified plaque. The distal right common carotid artery demonstrates calcified plaque.  Left Plaque Morph: The proximal left internal carotid artery demonstrates calcified plaque. The distal left common carotid artery demonstrates calcified plaque.      Right                       Left    PSV     EDV                PSV      EDV  60 cm/s 6 cm/s    CCA P    76 cm/s  11 cm/s  65 cm/s 10 cm/s   CCA D    67 cm/s  13 cm/s  65 cm/s 15 cm/s   ICA P    70 cm/s  15 cm/s  44 cm/s 10 cm/s   ICA M    77 cm/s  17 cm/s  47 cm/s 9 cm/s    ICA D    89 cm/s  21 cm/s  73 cm/s 6 cm/s     ECA     60 cm/s  3 cm/s  50 cm/s 9 cm/s  Vertebral  23 cm/s  4 cm/s  95 cm/s 0 cm/s  Subclavian 132 cm/s 0 cm/s                     Right Left  ICA/CCA Ratio  1.0  1.0          [id number] Avis Anne MD  Electronically signed by [id number] Avis Anne MD on 5/8/2024 at 10:49:04 PM         ** Final **         10:26 AM      Results were not successfully communicated with the patient and they acknowledged their understanding.    Pt has an upcoming appt with Dr. Ley to discuss results.  Overall testing looks good

## 2024-05-10 ENCOUNTER — OFFICE VISIT (OUTPATIENT)
Dept: PRIMARY CARE | Facility: CLINIC | Age: 79
End: 2024-05-10
Payer: MEDICARE

## 2024-05-10 VITALS
SYSTOLIC BLOOD PRESSURE: 138 MMHG | DIASTOLIC BLOOD PRESSURE: 78 MMHG | HEART RATE: 72 BPM | WEIGHT: 141 LBS | HEIGHT: 62 IN | BODY MASS INDEX: 25.95 KG/M2 | RESPIRATION RATE: 14 BRPM

## 2024-05-10 DIAGNOSIS — E78.00 ELEVATED LDL CHOLESTEROL LEVEL: ICD-10-CM

## 2024-05-10 DIAGNOSIS — E03.9 ACQUIRED HYPOTHYROIDISM: ICD-10-CM

## 2024-05-10 DIAGNOSIS — M81.0 AGE RELATED OSTEOPOROSIS, UNSPECIFIED PATHOLOGICAL FRACTURE PRESENCE: ICD-10-CM

## 2024-05-10 DIAGNOSIS — E11.9 DIABETES MELLITUS WITHOUT COMPLICATION (MULTI): ICD-10-CM

## 2024-05-10 DIAGNOSIS — Z00.00 GENERAL MEDICAL EXAM: ICD-10-CM

## 2024-05-10 DIAGNOSIS — I10 PRIMARY HYPERTENSION: Primary | ICD-10-CM

## 2024-05-10 PROBLEM — R20.2 HAND TINGLING: Status: RESOLVED | Noted: 2023-09-11 | Resolved: 2024-05-10

## 2024-05-10 PROBLEM — J32.9 SINUSITIS: Status: RESOLVED | Noted: 2023-09-11 | Resolved: 2024-05-10

## 2024-05-10 PROCEDURE — G0439 PPPS, SUBSEQ VISIT: HCPCS | Performed by: FAMILY MEDICINE

## 2024-05-10 PROCEDURE — 3075F SYST BP GE 130 - 139MM HG: CPT | Performed by: FAMILY MEDICINE

## 2024-05-10 PROCEDURE — 3078F DIAST BP <80 MM HG: CPT | Performed by: FAMILY MEDICINE

## 2024-05-10 PROCEDURE — 1170F FXNL STATUS ASSESSED: CPT | Performed by: FAMILY MEDICINE

## 2024-05-10 PROCEDURE — 99214 OFFICE O/P EST MOD 30 MIN: CPT | Performed by: FAMILY MEDICINE

## 2024-05-10 PROCEDURE — 1159F MED LIST DOCD IN RCRD: CPT | Performed by: FAMILY MEDICINE

## 2024-05-10 PROCEDURE — 1036F TOBACCO NON-USER: CPT | Performed by: FAMILY MEDICINE

## 2024-05-10 PROCEDURE — 1160F RVW MEDS BY RX/DR IN RCRD: CPT | Performed by: FAMILY MEDICINE

## 2024-05-10 ASSESSMENT — PATIENT HEALTH QUESTIONNAIRE - PHQ9
2. FEELING DOWN, DEPRESSED OR HOPELESS: NOT AT ALL
SUM OF ALL RESPONSES TO PHQ9 QUESTIONS 1 AND 2: 0
1. LITTLE INTEREST OR PLEASURE IN DOING THINGS: NOT AT ALL

## 2024-05-10 ASSESSMENT — ACTIVITIES OF DAILY LIVING (ADL)
DRESSING: INDEPENDENT
MANAGING_FINANCES: INDEPENDENT
TAKING_MEDICATION: INDEPENDENT
BATHING: INDEPENDENT
GROCERY_SHOPPING: INDEPENDENT
DOING_HOUSEWORK: INDEPENDENT

## 2024-05-10 NOTE — ASSESSMENT & PLAN NOTE
DMII, stable. Continue life style change. Will monitor A1C, urine albumin. advise eye exam by an OD yearly and checking bilateral feet for skin lesions qhs. Healthy diet and regular exercise.

## 2024-05-10 NOTE — PROGRESS NOTES
"Subjective   Patient ID: Alda Avila is a 78 y.o. female who presents for Medicare Annual Wellness Visit Subsequent (Est and fu).    HPI   No polydipsia, polyuria or significant weight changes. No lower extremities skin lesion. No LE numbness or tingling. No blurry vision. No GI upset  or muscle ache while on statin for high lipids. Normal appetite. No CP, HA, dizziness, heart palpitation. No claudication or cold LE.  No LE edema. No imbalance or falls. Good mood. pt denies having fatigue, cold or heat intolerance while on current dose of  Levoxyl. No dry skin or constipation. Pt sleeps well.     Review of Systems    Objective   /78   Pulse 72   Resp 14   Ht 1.575 m (5' 2\")   Wt 64 kg (141 lb)   BMI 25.79 kg/m²     Physical Exam  NAD, well groomed, No sclera icterus. neck: supple, no thyroid nodules, no cervical or axillary lymphadenopathy,  lungs: CTA b/l, heart: RRR, No LE edema, normal pedal pulses, abd: soft, no tenderness, BS+, normal strength and sensation  at bilateral lower extremities. No skin lesions at bilateral feet.  No dry skin, Good balance. CNII-XII were grossly intact, good judgment and memory. No depressed mood.    Assessment/Plan   Problem List Items Addressed This Visit             ICD-10-CM    Elevated LDL cholesterol level E78.00     Hyperlipidemia on statin. No GI upset or muscle ache. Will monitor labs for evaluation.  Health diet and regular exercise.  f/u in 3 mos.            Relevant Orders    Lipid Panel    Hypertension - Primary I10     BP has been controlled. Continue BP pills. keep a daily  bp log and bring in the log at the next office visit. Call office if BP is persistently over 130/80. DASH diet and regular exercise.          Hypothyroidism E03.9     Hypothyroidism, asymptomatic. Check TSH for evaluation. Will adjust dose of levothyroxine depending on the TSH level.           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 Recommend endocrine eval. Cont wt bearing exercise.          Relevant Orders    Referral to Endocrinology    Diabetes mellitus without complication (Multi) E11.9     DMII, stable. Continue life style change. Will monitor A1C, urine albumin. advise eye exam by an OD yearly and checking bilateral feet for skin lesions qhs. Healthy diet and regular exercise.          Relevant Orders    Albumin , Urine Random    CBC    Comprehensive Metabolic Panel    Hemoglobin A1C     Medicare wellness visit: pt was capable of performing all ADLs and IADLs. Pt has good memory and cognitive function. Advise eye exam by an OD yearly for glaucoma screen and dental exam every 6 months. check lipids.   will monitor blood pressure, cholesterol levels and weight regularly. Recommend sunscreen application if exposed to the sun when the UV index is over 2. Recommend  tetanus vaccine,  RSV, and covid shot.   Recommend pt to clear throw rugs at home and keep good lighting at night to avoid falls. Keep hot water for shower below 120F to avoid burn injury.  recommend grab bar at bathtub.   recommend to update living will and DPOA  pt  had been taking all current  medications as prescribed.

## 2024-05-10 NOTE — ASSESSMENT & PLAN NOTE
Hyperlipidemia on statin. No GI upset or muscle ache. Will monitor labs for evaluation.  Health diet and regular exercise.  f/u in 3 mos.

## 2024-05-14 ENCOUNTER — OFFICE VISIT (OUTPATIENT)
Dept: VASCULAR SURGERY | Facility: CLINIC | Age: 79
End: 2024-05-14
Payer: MEDICARE

## 2024-05-14 VITALS
HEART RATE: 88 BPM | BODY MASS INDEX: 25.79 KG/M2 | DIASTOLIC BLOOD PRESSURE: 68 MMHG | WEIGHT: 141 LBS | SYSTOLIC BLOOD PRESSURE: 172 MMHG

## 2024-05-14 DIAGNOSIS — I65.23 BILATERAL CAROTID ARTERY STENOSIS: Primary | ICD-10-CM

## 2024-05-14 PROCEDURE — 3078F DIAST BP <80 MM HG: CPT | Performed by: SURGERY

## 2024-05-14 PROCEDURE — 1159F MED LIST DOCD IN RCRD: CPT | Performed by: SURGERY

## 2024-05-14 PROCEDURE — 1160F RVW MEDS BY RX/DR IN RCRD: CPT | Performed by: SURGERY

## 2024-05-14 PROCEDURE — 3077F SYST BP >= 140 MM HG: CPT | Performed by: SURGERY

## 2024-05-14 PROCEDURE — 1036F TOBACCO NON-USER: CPT | Performed by: SURGERY

## 2024-05-14 PROCEDURE — 99213 OFFICE O/P EST LOW 20 MIN: CPT | Performed by: SURGERY

## 2024-05-14 NOTE — PROGRESS NOTES
Subjective   Patient ID: Alad Avila is a 78 y.o. female who presents for Follow-up (6 mo carotid).  HPI  Patient is here for follow-up secondary carotid stenosis  Patient is doing well  No significant evidence of amaurosis hemiplegia or hemiparesis  No current chest pain or shortness of breath  Patient does not claudicate no evidence of rest pain ulcers or sores both lower extremities noted.    Review of Systems  Review of Systems    Constitutional:  no generalized malaise overall feels well, energy levels intact, no complaints specifically noted  HEENT:  No blurry vision, no visual aides noted, no hearing loss no ear ache no nose bleeds noted, no dysphagia, no congestion otherwise no pertinent positives noted  Cardiovascular:  no palpitations, chest pain or heaviness noted, no leg swelling, no numbness or tingling in the lower extremity noted  Respiratory:  no shortness of breath, no productive cough noted, no conversation dyspnea or difficulty breathing  Gastrointestinal:  no abdominal pain, no nausea or vomiting, appetite intact, no bowel irregularities noted  Genitourinary:   no urinary incontinence, frequency or urgency issues noted, no hematuria or burning sensation issues  Musculoskeletal:  No muscle aches or pains, no joint discomfort noted, no back pain noted otherwise feels well  Skin: no ulcerations, skin color issues or wounds upper or lower extremities  Neurologic: no dizziness, no hemiplegia, no hemiparesis, no obvious visual deficits noted  Psychiatric: no depression, no memory loss noted, no suicidal ideation  Endocrine: no weight loss or gain, no temperature concerns hot or cold intolerance  Hemogolotic/Lymphatic: no bruising, excessive bleeding, no swelling in the groins or neck noted      Objective   Physical Exam  Physical exam    Constitutional: alert and in no acute distress verbal  Eyes: No erythema swelling or discharge noted  Neck: supple, symmetric, trachea midline, no masses  noted  Cardiovascular: Carotid pulses 2+, no obvious bruit, no Jugular distension noted, no thrill, heart regular rate, lower extremity vascular exam intact, cap refill <2 sec  Pulmonary:  Bilateral breath sounds intact, clear with rales rhonchi or wheeze  Abdomen: soft non tender, no pulsatile masses noted, no rebound rigidity or guarding noted  Skin: intact warm no abnormal turgor  Psychiatric: alert without any obvious cognitive issues, oriented to person, place, and time      Assessment/Plan   Duplex demonstrates minimal disease  Continue antiplatelet therapy and statin  Follow-up 1 year repeat imaging at that time.         Alcides Ley DO 05/14/24 3:06 PM

## 2024-05-17 ENCOUNTER — LAB (OUTPATIENT)
Dept: LAB | Facility: LAB | Age: 79
End: 2024-05-17
Payer: MEDICARE

## 2024-05-17 DIAGNOSIS — E11.9 DIABETES MELLITUS WITHOUT COMPLICATION (MULTI): ICD-10-CM

## 2024-05-17 DIAGNOSIS — E78.00 ELEVATED LDL CHOLESTEROL LEVEL: ICD-10-CM

## 2024-05-17 DIAGNOSIS — E03.9 ACQUIRED HYPOTHYROIDISM: ICD-10-CM

## 2024-05-17 LAB
ALBUMIN SERPL BCP-MCNC: 4.5 G/DL (ref 3.4–5)
ALP SERPL-CCNC: 73 U/L (ref 33–136)
ALT SERPL W P-5'-P-CCNC: 28 U/L (ref 7–45)
ANION GAP SERPL CALC-SCNC: 12 MMOL/L (ref 10–20)
AST SERPL W P-5'-P-CCNC: 25 U/L (ref 9–39)
BILIRUB SERPL-MCNC: 0.8 MG/DL (ref 0–1.2)
BUN SERPL-MCNC: 19 MG/DL (ref 6–23)
CALCIUM SERPL-MCNC: 10.1 MG/DL (ref 8.6–10.6)
CHLORIDE SERPL-SCNC: 103 MMOL/L (ref 98–107)
CHOLEST SERPL-MCNC: 133 MG/DL (ref 0–199)
CHOLESTEROL/HDL RATIO: 2.8
CO2 SERPL-SCNC: 29 MMOL/L (ref 21–32)
CREAT SERPL-MCNC: 0.82 MG/DL (ref 0.5–1.05)
CREAT UR-MCNC: 108.7 MG/DL (ref 20–320)
EGFRCR SERPLBLD CKD-EPI 2021: 72 ML/MIN/1.73M*2
ERYTHROCYTE [DISTWIDTH] IN BLOOD BY AUTOMATED COUNT: 12.7 % (ref 11.5–14.5)
EST. AVERAGE GLUCOSE BLD GHB EST-MCNC: 143 MG/DL
GLUCOSE SERPL-MCNC: 118 MG/DL (ref 74–99)
HBA1C MFR BLD: 6.6 %
HCT VFR BLD AUTO: 44.3 % (ref 36–46)
HDLC SERPL-MCNC: 46.8 MG/DL
HGB BLD-MCNC: 13.9 G/DL (ref 12–16)
LDLC SERPL CALC-MCNC: 65 MG/DL
MCH RBC QN AUTO: 27.6 PG (ref 26–34)
MCHC RBC AUTO-ENTMCNC: 31.4 G/DL (ref 32–36)
MCV RBC AUTO: 88 FL (ref 80–100)
MICROALBUMIN UR-MCNC: <7 MG/L
MICROALBUMIN/CREAT UR: NORMAL MG/G{CREAT}
NON HDL CHOLESTEROL: 86 MG/DL (ref 0–149)
NRBC BLD-RTO: 0 /100 WBCS (ref 0–0)
PLATELET # BLD AUTO: 265 X10*3/UL (ref 150–450)
POTASSIUM SERPL-SCNC: 4.6 MMOL/L (ref 3.5–5.3)
PROT SERPL-MCNC: 7.5 G/DL (ref 6.4–8.2)
RBC # BLD AUTO: 5.03 X10*6/UL (ref 4–5.2)
SODIUM SERPL-SCNC: 139 MMOL/L (ref 136–145)
TRIGL SERPL-MCNC: 108 MG/DL (ref 0–149)
TSH SERPL-ACNC: 1.27 MIU/L (ref 0.44–3.98)
VLDL: 22 MG/DL (ref 0–40)
WBC # BLD AUTO: 10 X10*3/UL (ref 4.4–11.3)

## 2024-05-17 PROCEDURE — 82570 ASSAY OF URINE CREATININE: CPT

## 2024-05-17 PROCEDURE — 83036 HEMOGLOBIN GLYCOSYLATED A1C: CPT

## 2024-05-17 PROCEDURE — 80061 LIPID PANEL: CPT

## 2024-05-17 PROCEDURE — 36415 COLL VENOUS BLD VENIPUNCTURE: CPT

## 2024-05-17 PROCEDURE — 84443 ASSAY THYROID STIM HORMONE: CPT

## 2024-05-17 PROCEDURE — 80053 COMPREHEN METABOLIC PANEL: CPT

## 2024-05-17 PROCEDURE — 82043 UR ALBUMIN QUANTITATIVE: CPT

## 2024-05-17 PROCEDURE — 85027 COMPLETE CBC AUTOMATED: CPT

## 2024-08-02 DIAGNOSIS — E05.90 THYROTOXICOSIS, UNSPECIFIED WITHOUT THYROTOXIC CRISIS OR STORM: ICD-10-CM

## 2024-08-02 RX ORDER — SULFAMETHOXAZOLE AND TRIMETHOPRIM 800; 160 MG/1; MG/1
60 TABLET ORAL DAILY
Qty: 90 TABLET | Refills: 1 | Status: SHIPPED | OUTPATIENT
Start: 2024-08-02

## 2024-08-09 ENCOUNTER — APPOINTMENT (OUTPATIENT)
Dept: PRIMARY CARE | Facility: CLINIC | Age: 79
End: 2024-08-09
Payer: MEDICARE

## 2024-09-06 ENCOUNTER — APPOINTMENT (OUTPATIENT)
Dept: RHEUMATOLOGY | Facility: CLINIC | Age: 79
End: 2024-09-06
Payer: MEDICARE

## 2024-09-13 ENCOUNTER — APPOINTMENT (OUTPATIENT)
Dept: PRIMARY CARE | Facility: CLINIC | Age: 79
End: 2024-09-13
Payer: MEDICARE

## 2024-09-13 VITALS — DIASTOLIC BLOOD PRESSURE: 64 MMHG | SYSTOLIC BLOOD PRESSURE: 122 MMHG

## 2024-09-13 DIAGNOSIS — E55.9 VITAMIN D DEFICIENCY: ICD-10-CM

## 2024-09-13 DIAGNOSIS — E11.9 DIABETES MELLITUS WITHOUT COMPLICATION (MULTI): Primary | ICD-10-CM

## 2024-09-13 DIAGNOSIS — E55.9 VITAMIN D DEFICIENCY, UNSPECIFIED: ICD-10-CM

## 2024-09-13 DIAGNOSIS — I10 PRIMARY HYPERTENSION: ICD-10-CM

## 2024-09-13 PROBLEM — E05.90 HYPERTHYROIDISM: Status: RESOLVED | Noted: 2023-09-11 | Resolved: 2024-09-13

## 2024-09-13 PROCEDURE — G2211 COMPLEX E/M VISIT ADD ON: HCPCS | Performed by: FAMILY MEDICINE

## 2024-09-13 PROCEDURE — 3078F DIAST BP <80 MM HG: CPT | Performed by: FAMILY MEDICINE

## 2024-09-13 PROCEDURE — 99214 OFFICE O/P EST MOD 30 MIN: CPT | Performed by: FAMILY MEDICINE

## 2024-09-13 PROCEDURE — 3074F SYST BP LT 130 MM HG: CPT | Performed by: FAMILY MEDICINE

## 2024-09-13 RX ORDER — ERGOCALCIFEROL 1.25 MG/1
1 CAPSULE ORAL
Qty: 6 CAPSULE | Refills: 3 | Status: SHIPPED | OUTPATIENT
Start: 2024-09-13

## 2024-09-13 NOTE — ASSESSMENT & PLAN NOTE
BP has been controlled. Continue BP pills. keep a daily  bp log and bring in the log at the next office visit. Call office if BP is persistently over 130/80. DASH diet and regular exercise.

## 2024-09-13 NOTE — ASSESSMENT & PLAN NOTE
DMII, controlled. Will monitor A1C, urine albumin. advise eye exam by an OD yearly and checking bilateral feet for skin lesions qhs. Healthy diet and regular exercise. Decrease calorie intake to lose wt.      Orders:    Comprehensive Metabolic Panel; Future    Hemoglobin A1C; Future    Albumin-Creatinine Ratio, Urine Random; Future

## 2024-09-13 NOTE — ASSESSMENT & PLAN NOTE
Check vitd level and cont vitamin d and will call pt re result  Orders:    Vitamin D 25-Hydroxy,Total (for eval of Vitamin D levels); Future

## 2024-09-13 NOTE — PROGRESS NOTES
Subjective   Patient ID: Alda Avila is a 79 y.o. female who presents for fu    HPI   Patient has been compliant with taking all  current medications.  No polydipsia, polyuria or significant weight changes. No lower extremities skin lesion. No LE numbness or tingling. No blurry vision.  Normal appetite. No CP, HA, dizziness, heart palpitation. No claudication or cold LE.  No LE edema. No imbalance or falls. Good mood.     Review of Systems    Objective   /64     Physical Exam  NAD, well groomed, No sclera icterus. lungs: CTA b/l, heart: RRR, No LE edema, normal pedal pulses, abd: soft, no tenderness, BS+, normal strength and sensation  at bilateral lower extremities. No skin lesions at bilateral feet.  Good balance. CNII-XII were grossly intact, good judgment and memory. No depressed mood.    Assessment/Plan   Assessment & Plan  Diabetes mellitus without complication (Multi)  DMII, controlled. Will monitor A1C, urine albumin. advise eye exam by an OD yearly and checking bilateral feet for skin lesions qhs. Healthy diet and regular exercise. Decrease calorie intake to lose wt.      Orders:    Comprehensive Metabolic Panel; Future    Hemoglobin A1C; Future    Albumin-Creatinine Ratio, Urine Random; Future    Primary hypertension  BP has been controlled. Continue BP pills. keep a daily  bp log and bring in the log at the next office visit. Call office if BP is persistently over 130/80. DASH diet and regular exercise.        Vitamin D deficiency  Check vitd level and cont vitamin d and will call pt re result  Orders:    Vitamin D 25-Hydroxy,Total (for eval of Vitamin D levels); Future

## 2024-10-04 DIAGNOSIS — I10 ESSENTIAL (PRIMARY) HYPERTENSION: ICD-10-CM

## 2024-10-04 DIAGNOSIS — R93.89 ABNORMAL FINDINGS ON DIAGNOSTIC IMAGING OF OTHER SPECIFIED BODY STRUCTURES: ICD-10-CM

## 2024-10-07 RX ORDER — AMLODIPINE BESYLATE 5 MG/1
5 TABLET ORAL DAILY
Qty: 90 TABLET | Refills: 1 | Status: SHIPPED | OUTPATIENT
Start: 2024-10-07

## 2024-10-07 RX ORDER — LOSARTAN POTASSIUM 100 MG/1
100 TABLET ORAL DAILY
Qty: 90 TABLET | Refills: 1 | Status: SHIPPED | OUTPATIENT
Start: 2024-10-07

## 2024-10-07 RX ORDER — ROSUVASTATIN CALCIUM 20 MG/1
20 TABLET, COATED ORAL DAILY
Qty: 90 TABLET | Refills: 1 | Status: SHIPPED | OUTPATIENT
Start: 2024-10-07

## 2024-11-06 ENCOUNTER — APPOINTMENT (OUTPATIENT)
Dept: ENDOCRINOLOGY | Facility: CLINIC | Age: 79
End: 2024-11-06
Payer: MEDICARE

## 2024-11-06 VITALS
HEART RATE: 67 BPM | WEIGHT: 143 LBS | DIASTOLIC BLOOD PRESSURE: 66 MMHG | HEIGHT: 63 IN | SYSTOLIC BLOOD PRESSURE: 132 MMHG | BODY MASS INDEX: 25.34 KG/M2

## 2024-11-06 DIAGNOSIS — M81.0 AGE RELATED OSTEOPOROSIS, UNSPECIFIED PATHOLOGICAL FRACTURE PRESENCE: ICD-10-CM

## 2024-11-06 PROCEDURE — 1159F MED LIST DOCD IN RCRD: CPT | Performed by: INTERNAL MEDICINE

## 2024-11-06 PROCEDURE — 3078F DIAST BP <80 MM HG: CPT | Performed by: INTERNAL MEDICINE

## 2024-11-06 PROCEDURE — G2211 COMPLEX E/M VISIT ADD ON: HCPCS | Performed by: INTERNAL MEDICINE

## 2024-11-06 PROCEDURE — 99203 OFFICE O/P NEW LOW 30 MIN: CPT | Performed by: INTERNAL MEDICINE

## 2024-11-06 PROCEDURE — 3075F SYST BP GE 130 - 139MM HG: CPT | Performed by: INTERNAL MEDICINE

## 2024-11-06 NOTE — PATIENT INSTRUCTIONS
Thank you for choosing [facility]  for your health care needs.  If you have any questions, concerns or medical needs, please feel free to contact our office at [phone].    Please ensure you complete your blood work one week before the next scheduled appointment.    There is no over the counter medication for osteoporosis  Osteoporosis is  process where you lose bone faster than you build bone   The recommendation is to take 1200mg of calcium per day in divided doses  To continue Vitamin D 50,000 once every two weeks   To continue exercise program   For repeat bone density scan in December 2025  Osteonecrosis of the jaw and atypical femoral fractures are rare complications of bisphosphonate use over long term   For follow up in December 2025

## 2024-11-06 NOTE — PROGRESS NOTES
"Subjective   Alda Avila is a 79 y.o. female who I am asked to see in consultation for evaluation of osteoporosis.     She saw rheumatology and she was prescribed Fosamax.  She opted not to take it due to side effects that she read about and were experienced by her friend.  She had a friend hwo needed surgery on her jaw.  She walks a lot and dances a lot.      She is now retired.  She was preivously neglecting her health.      She quit smoking and drink years ago      Osteoporosis Risk Factors   Nonmodifiable:  Personal Hx of fracture as an adult: no  Hx of fracture in first-degree relative: no   race: yes  Advanced age: yes  Female sex: yes  Dementia: no  Poor health/frailty: no     Potentially modifiable:  Tobacco use: no  Low body weight (<127 lbs): no  Estrogen deficiency     early menopause (age <45) or bilateral ovariectomy: not applicable     prolonged premenopausal amenorrhea (>1 yr): not applicable  Low calcium intake (lifelong): yes  Alcoholism: no  Recurrent falls: no  Inadequate physical activity: no    Current calcium and Vit D intake: Yes    Hx of GERD or other esophageal disease:  no  Plans for invasive dental procedures:  no    November 2024 - last eye examiantion     Review of Systems   All other systems reviewed and are negative.      Objective   /66 (BP Location: Left arm, Patient Position: Sitting, BP Cuff Size: Adult)   Pulse 67   Ht 1.588 m (5' 2.5\")   Wt 64.9 kg (143 lb)   BMI 25.74 kg/m²   Physical Exam  Vitals and nursing note reviewed.   Constitutional:       General: She is not in acute distress.     Appearance: Normal appearance. She is normal weight.   HENT:      Head: Normocephalic and atraumatic.      Nose: Nose normal.      Mouth/Throat:      Mouth: Mucous membranes are moist.   Eyes:      Extraocular Movements: Extraocular movements intact.   Pulmonary:      Effort: Pulmonary effort is normal.   Musculoskeletal:         General: Normal range of motion. " "  Neurological:      Mental Status: She is alert and oriented to person, place, and time.   Psychiatric:         Mood and Affect: Mood normal.          Imaging  Bone Density: Spine T Score: -1.4, Hip T Score: -1.9  === 12/21/23 ===    DEXA BONE DENSITY AXIAL SKELETON W VFA    - Impression -  DEXA:  According to World Health Organization criteria, classification is low bone mass (osteopenia)    Followup recommended in two years or sooner as clinically warranted.    VFA: There are mild wedge deformities of T6 and T9 vertebral bodies.    Lab Review  Lab Results   Component Value Date    CALCIUM 10.1 05/17/2024     Lab Results   Component Value Date    CREATININE 0.82 05/17/2024     No results found for: \"EGFRMUTATION\"  Lab Results   Component Value Date    PTH 39.2 03/01/2024     Lab Results   Component Value Date    VITD25 56 03/01/2024         Assessment/Plan   79-year-old female presents for the evaluation for the management of osteoporosis.  She was recommended to start on Fosamax by rheumatology but has not done so as she is concerned about side effects.    Age related osteoporosis  There is no over the counter medication for osteoporosis  Osteoporosis is  process where you lose bone faster than you build bone   The recommendation is to take 1200mg of calcium per day in divided doses  To continue Vitamin D 50,000 once every two weeks   To continue exercise program   For repeat bone density scan in December 2025  Osteonecrosis of the jaw and atypical femoral fractures are rare complications of bisphosphonate use over long term   Counseled regarding the risk of not commencing antiresorptive therapy as the risk for fracture is much higher  Counseled that the mortality rate is as high as 30% within the first year of sustaining a fracture  For follow up in December 2025   "

## 2024-11-10 NOTE — ASSESSMENT & PLAN NOTE
There is no over the counter medication for osteoporosis  Osteoporosis is  process where you lose bone faster than you build bone   The recommendation is to take 1200mg of calcium per day in divided doses  To continue Vitamin D 50,000 once every two weeks   To continue exercise program   For repeat bone density scan in December 2025  Osteonecrosis of the jaw and atypical femoral fractures are rare complications of bisphosphonate use over long term   Counseled regarding the risk of not commencing antiresorptive therapy as the risk for fracture is much higher  Counseled that the mortality rate is as high as 30% within the first year of sustaining a fracture  For follow up in December 2025

## 2024-11-27 ENCOUNTER — OFFICE VISIT (OUTPATIENT)
Dept: PRIMARY CARE | Facility: CLINIC | Age: 79
End: 2024-11-27
Payer: MEDICARE

## 2024-11-27 ENCOUNTER — LAB (OUTPATIENT)
Dept: LAB | Facility: LAB | Age: 79
End: 2024-11-27
Payer: MEDICARE

## 2024-11-27 VITALS — SYSTOLIC BLOOD PRESSURE: 123 MMHG | DIASTOLIC BLOOD PRESSURE: 63 MMHG

## 2024-11-27 DIAGNOSIS — E55.9 VITAMIN D DEFICIENCY: ICD-10-CM

## 2024-11-27 DIAGNOSIS — N30.00 ACUTE CYSTITIS WITHOUT HEMATURIA: ICD-10-CM

## 2024-11-27 DIAGNOSIS — N30.00 ACUTE CYSTITIS WITHOUT HEMATURIA: Primary | ICD-10-CM

## 2024-11-27 DIAGNOSIS — E11.9 DIABETES MELLITUS WITHOUT COMPLICATION (MULTI): ICD-10-CM

## 2024-11-27 LAB
25(OH)D3 SERPL-MCNC: 62 NG/ML (ref 30–100)
ALBUMIN SERPL BCP-MCNC: 4.4 G/DL (ref 3.4–5)
ALP SERPL-CCNC: 77 U/L (ref 33–136)
ALT SERPL W P-5'-P-CCNC: 22 U/L (ref 7–45)
ANION GAP SERPL CALC-SCNC: 12 MMOL/L (ref 10–20)
APPEARANCE UR: ABNORMAL
AST SERPL W P-5'-P-CCNC: 19 U/L (ref 9–39)
BILIRUB SERPL-MCNC: 0.7 MG/DL (ref 0–1.2)
BILIRUB UR STRIP.AUTO-MCNC: NEGATIVE MG/DL
BUN SERPL-MCNC: 16 MG/DL (ref 6–23)
CALCIUM SERPL-MCNC: 9.6 MG/DL (ref 8.6–10.6)
CHLORIDE SERPL-SCNC: 105 MMOL/L (ref 98–107)
CO2 SERPL-SCNC: 27 MMOL/L (ref 21–32)
COLOR UR: YELLOW
CREAT SERPL-MCNC: 0.76 MG/DL (ref 0.5–1.05)
CREAT UR-MCNC: 153 MG/DL (ref 20–320)
EGFRCR SERPLBLD CKD-EPI 2021: 79 ML/MIN/1.73M*2
EST. AVERAGE GLUCOSE BLD GHB EST-MCNC: 140 MG/DL
GLUCOSE SERPL-MCNC: 151 MG/DL (ref 74–99)
GLUCOSE UR STRIP.AUTO-MCNC: NORMAL MG/DL
HBA1C MFR BLD: 6.5 %
HOLD SPECIMEN: NORMAL
KETONES UR STRIP.AUTO-MCNC: NEGATIVE MG/DL
LEUKOCYTE ESTERASE UR QL STRIP.AUTO: ABNORMAL
MICROALBUMIN UR-MCNC: 258.2 MG/L
MICROALBUMIN/CREAT UR: 168.8 UG/MG CREAT
MUCOUS THREADS #/AREA URNS AUTO: ABNORMAL /LPF
NITRITE UR QL STRIP.AUTO: NEGATIVE
PH UR STRIP.AUTO: 5 [PH]
POTASSIUM SERPL-SCNC: 3.8 MMOL/L (ref 3.5–5.3)
PROT SERPL-MCNC: 7.5 G/DL (ref 6.4–8.2)
PROT UR STRIP.AUTO-MCNC: ABNORMAL MG/DL
RBC # UR STRIP.AUTO: ABNORMAL /UL
RBC #/AREA URNS AUTO: >20 /HPF
SODIUM SERPL-SCNC: 140 MMOL/L (ref 136–145)
SP GR UR STRIP.AUTO: 1.02
SQUAMOUS #/AREA URNS AUTO: ABNORMAL /HPF
UROBILINOGEN UR STRIP.AUTO-MCNC: NORMAL MG/DL
WBC #/AREA URNS AUTO: >50 /HPF
WBC CLUMPS #/AREA URNS AUTO: ABNORMAL /HPF

## 2024-11-27 PROCEDURE — 80053 COMPREHEN METABOLIC PANEL: CPT

## 2024-11-27 PROCEDURE — 3078F DIAST BP <80 MM HG: CPT | Performed by: FAMILY MEDICINE

## 2024-11-27 PROCEDURE — 83036 HEMOGLOBIN GLYCOSYLATED A1C: CPT

## 2024-11-27 PROCEDURE — 81001 URINALYSIS AUTO W/SCOPE: CPT

## 2024-11-27 PROCEDURE — 3074F SYST BP LT 130 MM HG: CPT | Performed by: FAMILY MEDICINE

## 2024-11-27 PROCEDURE — 82570 ASSAY OF URINE CREATININE: CPT

## 2024-11-27 PROCEDURE — 87086 URINE CULTURE/COLONY COUNT: CPT

## 2024-11-27 PROCEDURE — G2211 COMPLEX E/M VISIT ADD ON: HCPCS | Performed by: FAMILY MEDICINE

## 2024-11-27 PROCEDURE — 82043 UR ALBUMIN QUANTITATIVE: CPT

## 2024-11-27 PROCEDURE — 99213 OFFICE O/P EST LOW 20 MIN: CPT | Performed by: FAMILY MEDICINE

## 2024-11-27 PROCEDURE — 36415 COLL VENOUS BLD VENIPUNCTURE: CPT

## 2024-11-27 PROCEDURE — 82306 VITAMIN D 25 HYDROXY: CPT

## 2024-11-27 RX ORDER — SULFAMETHOXAZOLE AND TRIMETHOPRIM 800; 160 MG/1; MG/1
1 TABLET ORAL 2 TIMES DAILY
Qty: 6 TABLET | Refills: 0 | Status: SHIPPED | OUTPATIENT
Start: 2024-11-27 | End: 2024-11-30

## 2024-11-27 NOTE — PROGRESS NOTES
Subjective   Patient ID: Alda Avila is a 79 y.o. female who presents for uti    HPI   pt noticed low abd tenderness, dysuria, urinary frequency and urgency this am  no hematuria, flank pain, fever or chills. No nausea, vomiting. No cp, sob, HA. Normal appetite.   Review of Systems    Objective   There were no vitals taken for this visit.    Physical Exam  NAD, well groomed, No sclera icterus. Moist mouth mucosa, neck: supple, lungs: CTA b/l, heart: RRR, abd: soft, there was no  suprapubic tenderness, no rebound or guarding, BS+, no CVA percussion tenderness.  good balance. A&Ox3  Assessment/Plan   Assessment & Plan  Acute cystitis without hematuria  Patient is likely to have UTI due to typical symptoms. Will send urine out for culture. Start antibiotics as above. Increase fluid intake. Call office if worsening symptoms, sob, cp, flank pain, nausea, fever or chills occur.     Orders:    sulfamethoxazole-trimethoprim (Bactrim DS) 800-160 mg tablet; Take 1 tablet by mouth 2 times a day for 3 days.    Urinalysis with Reflex Culture and Microscopic; Future

## 2024-11-28 LAB — BACTERIA UR CULT: NORMAL

## 2024-11-29 DIAGNOSIS — R31.29 MICROHEMATURIA: Primary | ICD-10-CM

## 2024-12-13 ENCOUNTER — APPOINTMENT (OUTPATIENT)
Dept: PRIMARY CARE | Facility: CLINIC | Age: 79
End: 2024-12-13
Payer: MEDICARE

## 2025-01-10 ENCOUNTER — LAB (OUTPATIENT)
Dept: LAB | Facility: LAB | Age: 80
End: 2025-01-10
Payer: MEDICARE

## 2025-01-10 ENCOUNTER — OFFICE VISIT (OUTPATIENT)
Dept: PRIMARY CARE | Facility: CLINIC | Age: 80
End: 2025-01-10
Payer: MEDICARE

## 2025-01-10 VITALS — SYSTOLIC BLOOD PRESSURE: 121 MMHG | DIASTOLIC BLOOD PRESSURE: 66 MMHG

## 2025-01-10 DIAGNOSIS — N30.00 ACUTE CYSTITIS WITHOUT HEMATURIA: ICD-10-CM

## 2025-01-10 DIAGNOSIS — N30.00 ACUTE CYSTITIS WITHOUT HEMATURIA: Primary | ICD-10-CM

## 2025-01-10 LAB
APPEARANCE UR: ABNORMAL
BILIRUB UR STRIP.AUTO-MCNC: NEGATIVE MG/DL
COLOR UR: YELLOW
GLUCOSE UR STRIP.AUTO-MCNC: NORMAL MG/DL
KETONES UR STRIP.AUTO-MCNC: NEGATIVE MG/DL
LEUKOCYTE ESTERASE UR QL STRIP.AUTO: ABNORMAL
MUCOUS THREADS #/AREA URNS AUTO: ABNORMAL /LPF
NITRITE UR QL STRIP.AUTO: NEGATIVE
PH UR STRIP.AUTO: 7 [PH]
PROT UR STRIP.AUTO-MCNC: ABNORMAL MG/DL
RBC # UR STRIP.AUTO: ABNORMAL /UL
RBC #/AREA URNS AUTO: >20 /HPF
SP GR UR STRIP.AUTO: 1.02
SQUAMOUS #/AREA URNS AUTO: ABNORMAL /HPF
UROBILINOGEN UR STRIP.AUTO-MCNC: NORMAL MG/DL
WBC #/AREA URNS AUTO: >50 /HPF
WBC CLUMPS #/AREA URNS AUTO: ABNORMAL /HPF

## 2025-01-10 PROCEDURE — 87086 URINE CULTURE/COLONY COUNT: CPT

## 2025-01-10 PROCEDURE — 81001 URINALYSIS AUTO W/SCOPE: CPT

## 2025-01-10 RX ORDER — SULFAMETHOXAZOLE AND TRIMETHOPRIM 800; 160 MG/1; MG/1
1 TABLET ORAL 2 TIMES DAILY
Qty: 6 TABLET | Refills: 0 | Status: SHIPPED | OUTPATIENT
Start: 2025-01-10

## 2025-01-10 NOTE — PROGRESS NOTES
Subjective   Patient ID: Alda Avila is a 79 y.o. female who presents for uti    HPI   pt noticed low abd tenderness, dysuria, urinary frequency and urgency 3 days ago.  no hematuria, flank pain, fever or chills. No nausea, vomiting. No cp, sob, HA. Normal appetite. Symptoms persisted today    Review of Systems    Objective   /66     Physical Exam  NAD, well groomed, No sclera icterus. Moist mouth mucosa, neck: supple, lungs: CTA b/l, heart: RRR, abd: soft, there was no suprapubic tenderness, no rebound or guarding, BS+, no CVA percussion tenderness.  good balance. Assessment/Plan   Assessment & Plan  Acute cystitis without hematuria  UTI,  Patient is likely to have UTI due to typical symptoms. Will send urine out for culture. Start antibiotics as above. Increase fluid intake. Call office if worsening symptoms, sob, cp, flank pain, nausea, fever or chills occur.     Orders:    sulfamethoxazole-trimethoprim (Bactrim DS) 800-160 mg tablet; Take 1 tablet by mouth 2 times a day.    Urinalysis with Reflex Culture and Microscopic; Future

## 2025-01-11 LAB — HOLD SPECIMEN: NORMAL

## 2025-01-12 LAB — BACTERIA UR CULT: NORMAL

## 2025-02-06 ENCOUNTER — APPOINTMENT (OUTPATIENT)
Dept: UROLOGY | Facility: CLINIC | Age: 80
End: 2025-02-06
Payer: MEDICARE

## 2025-02-06 VITALS
SYSTOLIC BLOOD PRESSURE: 165 MMHG | BODY MASS INDEX: 24.8 KG/M2 | DIASTOLIC BLOOD PRESSURE: 79 MMHG | WEIGHT: 140 LBS | HEART RATE: 67 BPM | HEIGHT: 63 IN

## 2025-02-06 DIAGNOSIS — N39.0 URINARY TRACT INFECTION WITHOUT HEMATURIA, SITE UNSPECIFIED: Primary | ICD-10-CM

## 2025-02-06 DIAGNOSIS — R31.29 MICROHEMATURIA: ICD-10-CM

## 2025-02-06 LAB
POC BILIRUBIN, URINE: NEGATIVE
POC BLOOD, URINE: ABNORMAL
POC GLUCOSE, URINE: NEGATIVE MG/DL
POC KETONES, URINE: NEGATIVE MG/DL
POC LEUKOCYTES, URINE: ABNORMAL
POC NITRITE,URINE: NEGATIVE
POC PH, URINE: 6 PH
POC PROTEIN, URINE: ABNORMAL MG/DL
POC SPECIFIC GRAVITY, URINE: 1.02
POC UROBILINOGEN, URINE: 0.2 EU/DL

## 2025-02-06 PROCEDURE — 81003 URINALYSIS AUTO W/O SCOPE: CPT | Performed by: UROLOGY

## 2025-02-06 PROCEDURE — 3078F DIAST BP <80 MM HG: CPT | Performed by: UROLOGY

## 2025-02-06 PROCEDURE — 1036F TOBACCO NON-USER: CPT | Performed by: UROLOGY

## 2025-02-06 PROCEDURE — 1159F MED LIST DOCD IN RCRD: CPT | Performed by: UROLOGY

## 2025-02-06 PROCEDURE — 99204 OFFICE O/P NEW MOD 45 MIN: CPT | Performed by: UROLOGY

## 2025-02-06 PROCEDURE — 3077F SYST BP >= 140 MM HG: CPT | Performed by: UROLOGY

## 2025-02-06 NOTE — PROGRESS NOTES
02/06/2025  79-year-old female presents microscopic hematuria, no dysuria no nausea no vomiting.  Patient has had 2 episodes of UTI    We discussed hematuria workup including renal ultrasound, urine cytology urine culture and cystoscopy  We discussed recurrent urinary tract infection  All the questions were answered, the patient expressed understanding and agreed to the plan.    Impression  Microscopic hematuria  Recurrent urinary tract infection    Plan  Renal ultrasound for microscopic hematuria  Urine cytology  Cystoscopy  Possible prophylactic antibiotics    Chief Complaint   Patient presents with    Blood in Urine     Patient is here today as a new patient at the request of Dr. Jason Castillo for microscopic hematuria.  She also had recurrent UTI.          Physical Exam     TODAYS LAB RESULTS:    ntains abnormal data POCT UA Automated manually resulted  Order: [id number]   Status: Final result       Visible to patient: No (inaccessible in [facility])       Next appt: 03/14/2025 at 08:45 AM in Primary Care (Shobha Castillo MD PhD)       Dx: Microhematuria    0 Result Notes      Component  Ref Range & Units 15:23   POC Glucose, Urine  NEGATIVE mg/dl NEGATIVE   POC Bilirubin, Urine  NEGATIVE NEGATIVE   POC Ketones, Urine  NEGATIVE mg/dl NEGATIVE   POC Specific Gravity, Urine  1.005 - 1.035 1.020   POC Blood, Urine  NEGATIVE TRACE-Intact Abnormal    POC PH, Urine  No Reference Range Established PH 6.0   POC Protein, Urine  NEGATIVE mg/dl TRACE Abnormal    POC Urobilinogen, Urine  0.2, 1.0 EU/DL 0.2   Poc Nitrite, Urine  NEGATIVE NEGATIVE   POC Leukocytes, Urine  NEGATIVE SMALL (1+) Abnormal              Specimen Collected: 02/06/25 15:23 Last Resulted: 02/06/25 15:23         Urine Culture  Order: [id number] - Reflex for Order [id number]   Collected 1/10/2025 13:13       Status: Final result       Visible to patient: No (inaccessible in [facility])       Dx: Acute cystitis without hematuria    Specimen Information: Clean  Catch/Voided; Urine   0 Result Notes  Urine Culture Growth indicates contamination with mixed bacterial mary. Repeat culture if clinically indicated.        Resulting Agency: [facility]          Specimen Collected: 01/10/25 13:13 Last Resulted: 01/12/25 07:10       ASSESSMENT&PLAN:      IMPRESSIONS:

## 2025-02-06 NOTE — PATIENT INSTRUCTIONS
Impression  Microscopic hematuria  Recurrent urinary tract infection    Plan  Renal ultrasound for microscopic hematuria  Urine cytology  Cystoscopy  Possible prophylactic antibiotics

## 2025-02-10 ENCOUNTER — HOSPITAL ENCOUNTER (OUTPATIENT)
Dept: RADIOLOGY | Facility: HOSPITAL | Age: 80
Discharge: HOME | End: 2025-02-10
Payer: MEDICARE

## 2025-02-10 DIAGNOSIS — N39.0 URINARY TRACT INFECTION WITHOUT HEMATURIA, SITE UNSPECIFIED: ICD-10-CM

## 2025-02-10 DIAGNOSIS — R31.29 MICROHEMATURIA: ICD-10-CM

## 2025-02-10 LAB
LABORATORY COMMENT REPORT: NORMAL
LABORATORY COMMENT REPORT: NORMAL
PATH REPORT.FINAL DX SPEC: NORMAL
PATH REPORT.GROSS SPEC: NORMAL
PATH REPORT.RELEVANT HX SPEC: NORMAL
PATH REPORT.TOTAL CANCER: NORMAL
RESIDENT REVIEW: NORMAL

## 2025-02-10 PROCEDURE — 76770 US EXAM ABDO BACK WALL COMP: CPT

## 2025-02-10 PROCEDURE — 76770 US EXAM ABDO BACK WALL COMP: CPT | Performed by: STUDENT IN AN ORGANIZED HEALTH CARE EDUCATION/TRAINING PROGRAM

## 2025-02-11 ENCOUNTER — APPOINTMENT (OUTPATIENT)
Dept: PRIMARY CARE | Facility: CLINIC | Age: 80
End: 2025-02-11
Payer: COMMERCIAL

## 2025-02-11 ENCOUNTER — TELEPHONE (OUTPATIENT)
Dept: UROLOGY | Facility: CLINIC | Age: 80
End: 2025-02-11

## 2025-02-11 NOTE — TELEPHONE ENCOUNTER
Patient said that she was called yesterday with results from her US, are you able to call and give to her please  Thank you

## 2025-02-24 ENCOUNTER — APPOINTMENT (OUTPATIENT)
Dept: UROLOGY | Facility: CLINIC | Age: 80
End: 2025-02-24
Payer: MEDICARE

## 2025-02-24 VITALS
HEIGHT: 63 IN | DIASTOLIC BLOOD PRESSURE: 82 MMHG | WEIGHT: 140 LBS | BODY MASS INDEX: 24.8 KG/M2 | HEART RATE: 67 BPM | SYSTOLIC BLOOD PRESSURE: 186 MMHG

## 2025-02-24 DIAGNOSIS — N39.0 URINARY TRACT INFECTION WITHOUT HEMATURIA, SITE UNSPECIFIED: ICD-10-CM

## 2025-02-24 LAB
POC BILIRUBIN, URINE: NEGATIVE
POC BLOOD, URINE: ABNORMAL
POC GLUCOSE, URINE: NEGATIVE MG/DL
POC KETONES, URINE: NEGATIVE MG/DL
POC LEUKOCYTES, URINE: ABNORMAL
POC NITRITE,URINE: NEGATIVE
POC PH, URINE: 6 PH
POC PROTEIN, URINE: NEGATIVE MG/DL
POC SPECIFIC GRAVITY, URINE: 1.02
POC UROBILINOGEN, URINE: 0.2 EU/DL

## 2025-02-24 PROCEDURE — 81003 URINALYSIS AUTO W/O SCOPE: CPT | Performed by: UROLOGY

## 2025-02-24 PROCEDURE — 52000 CYSTOURETHROSCOPY: CPT | Performed by: UROLOGY

## 2025-02-24 RX ORDER — LIDOCAINE HYDROCHLORIDE 20 MG/ML
1 JELLY TOPICAL ONCE
Status: COMPLETED | OUTPATIENT
Start: 2025-02-24 | End: 2025-02-24

## 2025-02-24 RX ORDER — CIPROFLOXACIN 500 MG/1
500 TABLET ORAL ONCE
Status: DISCONTINUED | OUTPATIENT
Start: 2025-02-24 | End: 2025-02-24

## 2025-02-24 RX ORDER — CIPROFLOXACIN 500 MG/1
500 TABLET ORAL ONCE
Status: COMPLETED | OUTPATIENT
Start: 2025-02-24 | End: 2025-02-24

## 2025-02-24 RX ORDER — LIDOCAINE HYDROCHLORIDE 20 MG/ML
1 JELLY TOPICAL ONCE
Status: DISCONTINUED | OUTPATIENT
Start: 2025-02-24 | End: 2025-02-24

## 2025-02-24 RX ADMIN — LIDOCAINE HYDROCHLORIDE 1 APPLICATION: 20 JELLY TOPICAL at 15:21

## 2025-02-24 RX ADMIN — CIPROFLOXACIN 500 MG: 500 TABLET ORAL at 14:30

## 2025-02-24 NOTE — PROGRESS NOTES
02/24/2025  Cystoscopy    Cystoscopy performed under local without difficulty    Findings: Normal urethra normal bladder no tumor no stone    Mild atrophic vaginitis    Urine culture: Contamination    We discussed hematuria workup including renal ultrasound and urine culture urine cytology and cystoscopy, unremarkable  All the questions were answered, the patient expressed understanding and agreed to the plan.    Impression  Atrophic vaginitis  Microscopic hematuria negative workup  Recurrent urinary tract infection     Plan  Possible Estrace cream  Possible antibiotics prophylaxis, Macrodantin 100 mg daily  Follow-up with PCP  Call if problem    Chief Complaint   Patient presents with    Blood in Urine     Pt is here today for a cystoscopy for microscopic hematuria and recurrent UTI's.        Physical Exam     TODAYS LAB RESULTS:  POCT UA Automated manually resulted  Order: [id number]   Status: Final result       Visible to patient: No (inaccessible in [facility])       Next appt: 03/14/2025 at 08:45 AM in Primary Care (Shobha Castillo MD PhD)       Dx: Urinary tract infection without hemat...    0 Result Notes       Component  Ref Range & Units 14:34 2 wk ago   POC Glucose, Urine  NEGATIVE mg/dl NEGATIVE NEGATIVE   POC Bilirubin, Urine  NEGATIVE NEGATIVE NEGATIVE   POC Ketones, Urine  NEGATIVE mg/dl NEGATIVE NEGATIVE   POC Specific Gravity, Urine  1.005 - 1.035 1.020 1.020   POC Blood, Urine  NEGATIVE TRACE-Intact Abnormal  TRACE-Intact Abnormal    POC PH, Urine  No Reference Range Established PH 6.0 6.0   POC Protein, Urine  NEGATIVE mg/dl NEGATIVE TRACE Abnormal    POC Urobilinogen, Urine  0.2, 1.0 EU/DL 0.2 0.2   Poc Nitrite, Urine  NEGATIVE NEGATIVE NEGATIVE   POC Leukocytes, Urine  NEGATIVE SMALL (1+) Abnormal  SMALL (1+) Abnormal           === 02/10/25 ===    US RENAL COMPLETE    - Impression -  1. No nephrolithiasis or hydronephrosis.    MACRO:  None      Signed by: Jacob Bhandari 2/11/2025 6:16 AM  Dictation  workstation:   [id number]    Non-gynecologic cytology: [id number]  Order: [id number]   Collected 2/6/2025 15:55       Status: Final result       Visible to patient: No (inaccessible in [facility])       Dx: Microhematuria    1 Result Note      Component    Final Cytological Interpretation      A. URINE CLEAN CATCH                                                No malignant cells identified                                                   Electronically signed by Breanna Stout MD on 2/10/2025 at 1438          ASSESSMENT&PLAN:      IMPRESSIONS:         02/06/2025  79-year-old female presents microscopic hematuria, no dysuria no nausea no vomiting.  Patient has had 2 episodes of UTI     We discussed hematuria workup including renal ultrasound, urine cytology urine culture and cystoscopy  We discussed recurrent urinary tract infection  All the questions were answered, the patient expressed understanding and agreed to the plan.     Impression  Microscopic hematuria  Recurrent urinary tract infection     Plan  Renal ultrasound for microscopic hematuria  Urine cytology  Cystoscopy  Possible prophylactic antibiotics

## 2025-02-24 NOTE — PATIENT INSTRUCTIONS
Impression  Atrophic vaginitis  Microscopic hematuria negative workup  Recurrent urinary tract infection     Plan  Possible Estrace cream  Possible antibiotics prophylaxis, Macrodantin 100 mg daily  Follow-up with PCP  Call if problem

## 2025-03-14 ENCOUNTER — APPOINTMENT (OUTPATIENT)
Dept: PRIMARY CARE | Facility: CLINIC | Age: 80
End: 2025-03-14
Payer: MEDICARE

## 2025-03-14 VITALS — HEART RATE: 68 BPM | DIASTOLIC BLOOD PRESSURE: 70 MMHG | SYSTOLIC BLOOD PRESSURE: 150 MMHG

## 2025-03-14 DIAGNOSIS — E55.9 VITAMIN D DEFICIENCY, UNSPECIFIED: ICD-10-CM

## 2025-03-14 DIAGNOSIS — E11.9 DIABETES MELLITUS WITHOUT COMPLICATION (MULTI): ICD-10-CM

## 2025-03-14 DIAGNOSIS — I10 ESSENTIAL (PRIMARY) HYPERTENSION: ICD-10-CM

## 2025-03-14 DIAGNOSIS — E78.00 ELEVATED LDL CHOLESTEROL LEVEL: Primary | ICD-10-CM

## 2025-03-14 DIAGNOSIS — E03.9 ACQUIRED HYPOTHYROIDISM: ICD-10-CM

## 2025-03-14 PROCEDURE — 3077F SYST BP >= 140 MM HG: CPT | Performed by: FAMILY MEDICINE

## 2025-03-14 PROCEDURE — 1159F MED LIST DOCD IN RCRD: CPT | Performed by: FAMILY MEDICINE

## 2025-03-14 PROCEDURE — G2211 COMPLEX E/M VISIT ADD ON: HCPCS | Performed by: FAMILY MEDICINE

## 2025-03-14 PROCEDURE — 1036F TOBACCO NON-USER: CPT | Performed by: FAMILY MEDICINE

## 2025-03-14 PROCEDURE — 1160F RVW MEDS BY RX/DR IN RCRD: CPT | Performed by: FAMILY MEDICINE

## 2025-03-14 PROCEDURE — 99214 OFFICE O/P EST MOD 30 MIN: CPT | Performed by: FAMILY MEDICINE

## 2025-03-14 PROCEDURE — 3078F DIAST BP <80 MM HG: CPT | Performed by: FAMILY MEDICINE

## 2025-03-14 RX ORDER — ROSUVASTATIN CALCIUM 20 MG/1
20 TABLET, COATED ORAL DAILY
Qty: 90 TABLET | Refills: 1 | Status: SHIPPED | OUTPATIENT
Start: 2025-03-14 | End: 2025-03-14 | Stop reason: ALTCHOICE

## 2025-03-14 RX ORDER — AMLODIPINE BESYLATE 10 MG/1
5 TABLET ORAL DAILY
Qty: 90 TABLET | Refills: 3 | Status: SHIPPED | OUTPATIENT
Start: 2025-03-14

## 2025-03-14 RX ORDER — ROSUVASTATIN CALCIUM 20 MG/1
20 TABLET, COATED ORAL DAILY
Qty: 90 TABLET | Refills: 1 | Status: SHIPPED | OUTPATIENT
Start: 2025-03-14

## 2025-03-14 RX ORDER — AMLODIPINE BESYLATE 5 MG/1
5 TABLET ORAL DAILY
Qty: 90 TABLET | Refills: 1 | Status: SHIPPED | OUTPATIENT
Start: 2025-03-14 | End: 2025-03-14 | Stop reason: SDUPTHER

## 2025-03-14 RX ORDER — LOSARTAN POTASSIUM 100 MG/1
100 TABLET ORAL DAILY
Qty: 90 TABLET | Refills: 1 | Status: SHIPPED | OUTPATIENT
Start: 2025-03-14

## 2025-03-14 RX ORDER — AMLODIPINE BESYLATE 5 MG/1
5 TABLET ORAL DAILY
Qty: 90 TABLET | Refills: 1 | Status: SHIPPED | OUTPATIENT
Start: 2025-03-14 | End: 2025-03-14 | Stop reason: ALTCHOICE

## 2025-03-14 RX ORDER — THYROID 60 MG/1
60 TABLET ORAL DAILY
Qty: 90 TABLET | Refills: 1 | Status: SHIPPED | OUTPATIENT
Start: 2025-03-14 | End: 2025-03-14 | Stop reason: SDUPTHER

## 2025-03-14 RX ORDER — THYROID 60 MG/1
60 TABLET ORAL DAILY
Qty: 90 TABLET | Refills: 1 | Status: SHIPPED | OUTPATIENT
Start: 2025-03-14

## 2025-03-14 RX ORDER — ERGOCALCIFEROL 1.25 MG/1
1 CAPSULE ORAL
Qty: 6 CAPSULE | Refills: 3 | Status: SHIPPED | OUTPATIENT
Start: 2025-03-14

## 2025-03-14 ASSESSMENT — PATIENT HEALTH QUESTIONNAIRE - PHQ9
1. LITTLE INTEREST OR PLEASURE IN DOING THINGS: NOT AT ALL
2. FEELING DOWN, DEPRESSED OR HOPELESS: NOT AT ALL
SUM OF ALL RESPONSES TO PHQ9 QUESTIONS 1 AND 2: 0

## 2025-03-14 NOTE — PROGRESS NOTES
Subjective   Patient ID: Alda Avila is a 79 y.o. female who presents for No chief complaint on file..    HPI   Patient has been compliant with taking all  current medications.  No polydipsia, polyuria or significant weight changes. No lower extremities skin lesion. No LE numbness or tingling. No blurry vision. No GI upset  or muscle ache while on statin for high lipids. Normal appetite. No CP, HA, dizziness, heart palpitation. No claudication or cold LE.  No LE edema. No imbalance or falls. pt denies having fatigue, cold or heat intolerance while on current dose of  thyroid. No  dry skin or constipation. No wt changes. Pt sleeps well. No depressed mood.    Review of Systems    Objective   /70   Pulse 68     Physical Exam  NAD, well groomed, eyes: , No sclera icterus. neck: supple,  no thyroid enlargement.  lungs: CTA b/l, heart: RRR, no LE edema, abd: soft, no tenderness, BS+, normal strength and sensation  at bilateral lower extremities. No dry skin or dry hair. No tremor. Good balance. CNII-XII were grossly intact, No depressed mood    Assessment/Plan   Assessment & Plan  Essential (primary) hypertension  BP has not  been controlled. Change  BP pill as dir.  keep a daily  bp log and bring in the log at the next office visit. Call office if BP is persistently over 130/80. DASH diet and regular exercise.     Orders:    losartan (Cozaar) 100 mg tablet; Take 1 tablet (100 mg) by mouth once daily.    Albumin-Creatinine Ratio, Urine Random; Future    Comprehensive Metabolic Panel; Future    Lipid Panel; Future    TSH with reflex to Free T4 if abnormal; Future    Hemoglobin A1C; Future    amLODIPine (Norvasc) 10 mg tablet; Take 0.5 tablets (5 mg) by mouth once daily.    Vitamin D deficiency, unspecified  Stable vitd level. Cont the same  Orders:    ergocalciferol (Vitamin D-2) 1250 mcg (50,000 units) capsule; Take 1 capsule (1,250 mcg) by mouth every 14 (fourteen) days.    Albumin-Creatinine Ratio, Urine  Random; Future    Comprehensive Metabolic Panel; Future    Lipid Panel; Future    TSH with reflex to Free T4 if abnormal; Future    Hemoglobin A1C; Future    Elevated LDL cholesterol level  Hyperlipidemia on statin. No GI upset or muscle ache. Will monitor labs for evaluation.  Health diet and regular exercise. Decrease calorie intake to lose wt.  f/u in 3 mos.     Orders:    rosuvastatin (Crestor) 20 mg tablet; Take 1 tablet (20 mg) by mouth once daily.    Albumin-Creatinine Ratio, Urine Random; Future    Comprehensive Metabolic Panel; Future    Lipid Panel; Future    TSH with reflex to Free T4 if abnormal; Future    Hemoglobin A1C; Future    Acquired hypothyroidism  Hypothyroidism, asymptomatic. Check TSH for evaluation. Will adjust dose of levothyroxine depending on the TSH level.    Orders:    thyroid, pork, ([place] Thyroid) 60 mg tablet; Take 1 tablet (60 mg) by mouth once daily.    Albumin-Creatinine Ratio, Urine Random; Future    Comprehensive Metabolic Panel; Future    Lipid Panel; Future    TSH with reflex to Free T4 if abnormal; Future    Hemoglobin A1C; Future    Diabetes mellitus without complication (Multi)  DMII, controlled without medications. Will monitor A1C, urine albumin. advise eye exam by an OD yearly and checking bilateral feet for skin lesions qhs. Healthy diet and regular exercise.   Orders:    Albumin-Creatinine Ratio, Urine Random; Future    Comprehensive Metabolic Panel; Future    Lipid Panel; Future    TSH with reflex to Free T4 if abnormal; Future    Hemoglobin A1C; Future

## 2025-03-14 NOTE — ASSESSMENT & PLAN NOTE
Hyperlipidemia on statin. No GI upset or muscle ache. Will monitor labs for evaluation.  Health diet and regular exercise. Decrease calorie intake to lose wt.  f/u in 3 mos.     Orders:    rosuvastatin (Crestor) 20 mg tablet; Take 1 tablet (20 mg) by mouth once daily.    Albumin-Creatinine Ratio, Urine Random; Future    Comprehensive Metabolic Panel; Future    Lipid Panel; Future    TSH with reflex to Free T4 if abnormal; Future    Hemoglobin A1C; Future

## 2025-03-14 NOTE — ASSESSMENT & PLAN NOTE
Stable vitd level. Cont the same  Orders:    ergocalciferol (Vitamin D-2) 1250 mcg (50,000 units) capsule; Take 1 capsule (1,250 mcg) by mouth every 14 (fourteen) days.    Albumin-Creatinine Ratio, Urine Random; Future    Comprehensive Metabolic Panel; Future    Lipid Panel; Future    TSH with reflex to Free T4 if abnormal; Future    Hemoglobin A1C; Future

## 2025-03-14 NOTE — ASSESSMENT & PLAN NOTE
BP has not  been controlled. Change  BP pill as dir.  keep a daily  bp log and bring in the log at the next office visit. Call office if BP is persistently over 130/80. DASH diet and regular exercise.     Orders:    losartan (Cozaar) 100 mg tablet; Take 1 tablet (100 mg) by mouth once daily.    Albumin-Creatinine Ratio, Urine Random; Future    Comprehensive Metabolic Panel; Future    Lipid Panel; Future    TSH with reflex to Free T4 if abnormal; Future    Hemoglobin A1C; Future    amLODIPine (Norvasc) 10 mg tablet; Take 0.5 tablets (5 mg) by mouth once daily.

## 2025-03-14 NOTE — ASSESSMENT & PLAN NOTE
DMII, controlled without medications. Will monitor A1C, urine albumin. advise eye exam by an OD yearly and checking bilateral feet for skin lesions qhs. Healthy diet and regular exercise.   Orders:    Albumin-Creatinine Ratio, Urine Random; Future    Comprehensive Metabolic Panel; Future    Lipid Panel; Future    TSH with reflex to Free T4 if abnormal; Future    Hemoglobin A1C; Future

## 2025-03-14 NOTE — ASSESSMENT & PLAN NOTE
Hypothyroidism, asymptomatic. Check TSH for evaluation. Will adjust dose of levothyroxine depending on the TSH level.    Orders:    thyroid, pork, ([place] Thyroid) 60 mg tablet; Take 1 tablet (60 mg) by mouth once daily.    Albumin-Creatinine Ratio, Urine Random; Future    Comprehensive Metabolic Panel; Future    Lipid Panel; Future    TSH with reflex to Free T4 if abnormal; Future    Hemoglobin A1C; Future

## 2025-03-24 ENCOUNTER — TELEPHONE (OUTPATIENT)
Dept: PRIMARY CARE | Facility: CLINIC | Age: 80
End: 2025-03-24
Payer: MEDICARE

## 2025-03-24 DIAGNOSIS — I10 ESSENTIAL (PRIMARY) HYPERTENSION: ICD-10-CM

## 2025-03-24 RX ORDER — AMLODIPINE BESYLATE 10 MG/1
10 TABLET ORAL DAILY
Qty: 90 TABLET | Refills: 3 | Status: SHIPPED | OUTPATIENT
Start: 2025-03-24

## 2025-03-24 NOTE — TELEPHONE ENCOUNTER
Pt stated that she was told to take 10mg daily of amlodepine and rx  amLODIPine (Norvasc) 10 mg tablet//Take 0.5 tablets (5 mg) by mouth once daily.  Pt would like the sig to reflect that she is taking a full pill for refills.

## 2025-05-08 ENCOUNTER — HOSPITAL ENCOUNTER (OUTPATIENT)
Dept: VASCULAR MEDICINE | Facility: HOSPITAL | Age: 80
Discharge: HOME | End: 2025-05-08
Payer: MEDICARE

## 2025-05-08 DIAGNOSIS — I65.23 BILATERAL CAROTID ARTERY STENOSIS: ICD-10-CM

## 2025-05-08 PROCEDURE — 93880 EXTRACRANIAL BILAT STUDY: CPT

## 2025-05-08 PROCEDURE — 93880 EXTRACRANIAL BILAT STUDY: CPT | Performed by: SURGERY

## 2025-05-16 ENCOUNTER — APPOINTMENT (OUTPATIENT)
Dept: PRIMARY CARE | Facility: CLINIC | Age: 80
End: 2025-05-16
Payer: MEDICARE

## 2025-05-20 ENCOUNTER — APPOINTMENT (OUTPATIENT)
Dept: VASCULAR SURGERY | Facility: CLINIC | Age: 80
End: 2025-05-20
Payer: MEDICARE

## 2025-05-20 VITALS
WEIGHT: 142 LBS | SYSTOLIC BLOOD PRESSURE: 150 MMHG | BODY MASS INDEX: 25.56 KG/M2 | HEART RATE: 67 BPM | DIASTOLIC BLOOD PRESSURE: 74 MMHG

## 2025-05-20 DIAGNOSIS — I65.23 BILATERAL CAROTID ARTERY STENOSIS: Primary | ICD-10-CM

## 2025-05-20 PROCEDURE — 1036F TOBACCO NON-USER: CPT | Performed by: SURGERY

## 2025-05-20 PROCEDURE — 99213 OFFICE O/P EST LOW 20 MIN: CPT | Performed by: SURGERY

## 2025-05-20 PROCEDURE — 3078F DIAST BP <80 MM HG: CPT | Performed by: SURGERY

## 2025-05-20 PROCEDURE — 3077F SYST BP >= 140 MM HG: CPT | Performed by: SURGERY

## 2025-05-20 ASSESSMENT — ENCOUNTER SYMPTOMS
FACIAL ASYMMETRY: 0
NECK PAIN: 0
FATIGUE: 0
COUGH: 0
CHILLS: 0
COLOR CHANGE: 0
JOINT SWELLING: 0
VOMITING: 0
LIGHT-HEADEDNESS: 0
ADENOPATHY: 0
WOUND: 0
DIFFICULTY URINATING: 0
ABDOMINAL PAIN: 0
ARTHRALGIAS: 0
CONSTIPATION: 0
SEIZURES: 0
HEADACHES: 0
SHORTNESS OF BREATH: 0
FEVER: 0
CONFUSION: 0
NUMBNESS: 0
SPEECH DIFFICULTY: 0
WEAKNESS: 0
SLEEP DISTURBANCE: 0
TROUBLE SWALLOWING: 0
DIZZINESS: 0
PALPITATIONS: 0
DIARRHEA: 0
WHEEZING: 0
NAUSEA: 0
SORE THROAT: 0

## 2025-05-20 NOTE — PROGRESS NOTES
Subjective   Patient ID: Alda Avila is a 79 y.o. female who presents for Follow-up (1 yr carotid/).  HPI  79 year old female presents for annual follow up visit for carotid stenosis. General health has been good over the past 1 year. No TIA/CVA. No amaurosis fugax. Ambulatory no lower extremity claudication or swelling. No complaints today.,     5/8/25 Carotid Duplex  CONCLUSIONS:  Right Carotid: Findings are consistent with less than 50% stenosis of the right proximal internal carotid artery. Laminar flow seen by color Doppler. Right external carotid artery appears patent with no evidence of stenosis. The right vertebral artery is patent with antegrade flow. No evidence of hemodynamically significant stenosis in the right subclavian artery.  Left Carotid: Findings are consistent with less than 50% stenosis of the left proximal internal carotid artery. Laminar flow seen by color Doppler. Left external carotid artery appears patent with no evidence of stenosis. The left vertebral artery is patent with antegrade flow. No evidence of hemodynamically significant stenosis in the left subclavian artery.     Comparison:  Compared with study from 5/7/2024, no significant change.     Additional Findings:  Velocities may be underestimated due to calcified shadowing plaque.        Imaging & Doppler Findings:  Right Plaque Morph: The proximal right internal carotid artery demonstrates calcified and heterogenous plaque. The proximal right external carotid artery demonstrates heterogenous and calcified plaque. The distal right common carotid artery demonstrates heterogenous plaque.  Left Plaque Morph: The proximal left internal carotid artery demonstrates calcified and heterogenous plaque. The proximal left external carotid artery demonstrates heterogenous plaque. The distal left common carotid artery demonstrates heterogenous and calcified plaque.      Right                        Left    PSV      EDV                PSV       EDV  81 cm/s            CCA P    83 cm/s  73 cm/s            CCA D    80 cm/s  74 cm/s  16 cm/s   ICA P    133 cm/s 28 cm/s  70 cm/s            ICA M    59 cm/s  61 cm/s            ICA D    86 cm/s  84 cm/s             ECA     87 cm/s  47 cm/s          Vertebral  28 cm/s  119 cm/s         Subclavian 153 cm/s                   Right Left  ICA/CCA Ratio  1.0  1.7    5/7/24 Carotid Duplex  CONCLUSIONS:  Right Carotid: Findings are consistent with less than 50% stenosis of the right proximal internal carotid artery. Laminar flow seen by color Doppler. Right external carotid artery appears patent with no evidence of stenosis. The right vertebral artery is patent with antegrade flow. No evidence of hemodynamically significant stenosis in the right subclavian artery.  Left Carotid: Findings are consistent with less than 50% stenosis of the left proximal internal carotid artery. Laminar flow seen by color Doppler. Left external carotid artery appears patent with no evidence of stenosis. The left vertebral artery is patent with antegrade flow. No evidence of hemodynamically significant stenosis in the left subclavian artery.     Comparison:  Compared with study from 10/23/2023, no significant change.     Imaging & Doppler Findings:  Right Plaque Morph: The proximal right internal carotid artery demonstrates calcified plaque. The distal right common carotid artery demonstrates calcified plaque.  Left Plaque Morph: The proximal left internal carotid artery demonstrates calcified plaque. The distal left common carotid artery demonstrates calcified plaque.      Right                       Left    PSV     EDV                PSV      EDV  60 cm/s 6 cm/s    CCA P    76 cm/s  11 cm/s  65 cm/s 10 cm/s   CCA D    67 cm/s  13 cm/s  65 cm/s 15 cm/s   ICA P    70 cm/s  15 cm/s  44 cm/s 10 cm/s   ICA M    77 cm/s  17 cm/s  47 cm/s 9 cm/s    ICA D    89 cm/s  21 cm/s  73 cm/s 6 cm/s     ECA     60 cm/s  3 cm/s  50 cm/s 9 cm/s   Vertebral  23 cm/s  4 cm/s  95 cm/s 0 cm/s  Subclavian 132 cm/s 0 cm/s                      Right Left  ICA/CCA Ratio  1.0  1.0    10/12/2022 Carotid Duplex  CONCLUSIONS:  Right Carotid: Findings are consistent with less than 50% stenosis of the right proximal ICA. Laminar flow seen by color Doppler. Right external carotid artery appears patent with no evidence of stenosis. No evidence of hemodynamically significant stenosis of the right common carotid artery. The right vertebral artery is patent with antegrade flow. No evidence of hemodynamically significant stenosis in the right subclavian.  Left Carotid: Findings are consistent with 50 to 69% stenosis of the left proximal ICA. Turbulent flow seen by color Doppler. Left external carotid artery appears patent with no evidence of stenosis. No evidence of hemodynamically significant stenosis of the left common carotid artery. The left vertebral artery is patent with antegrade flow. No evidence of hemodynamically significant stenosis in the left subclavian.     Comparison:  Compared with study from 7/23/2021, The right internal carotid appears similar to the previous exam.     The left internal carotid on the previous exam was greater than 70% by velocity criteria. On today's exam multiple attempts were made to obtain previous velocities, and was unsuccessful. The exam performed today the internal carotid velocity is 50-69%.     The previous CTA perfomed indicated the left internal carotid was near 60%.     Imaging & Doppler Findings:  Right Plaque Morph: The proximal right internal carotid artery demonstrates calcified plaque. The distal right common carotid artery demonstrates heterogenous plaque.  Left Plaque Morph: The proximal left internal carotid artery demonstrates calcified plaque. The distal left common carotid artery demonstrates heterogenous plaque.      Right                       Left    PSV     EDV                PSV      EDV  80 cm/s 11 cm/s   CCA P     69 cm/s  13 cm/s  64 cm/s 16 cm/s   CCA D    74 cm/s  15 cm/s  70 cm/s 12 cm/s   ICA P    151 cm/s 41 cm/s  53 cm/s 15 cm/s   ICA M    73 cm/s  19 cm/s  42 cm/s 13 cm/s   ICA D    97 cm/s  22 cm/s  72 cm/s 8 cm/s     ECA     79 cm/s  7 cm/s  40 cm/s 11 cm/s Vertebral  25 cm/s  5 cm/s  98 cm/s 0 cm/s  Subclavian 138 cm/s     Review of Systems   Constitutional:  Negative for chills, fatigue and fever.   HENT:  Negative for congestion, sore throat and trouble swallowing.    Eyes:  Negative for visual disturbance.   Respiratory:  Negative for cough, shortness of breath and wheezing.    Cardiovascular:  Negative for chest pain, palpitations and leg swelling.   Gastrointestinal:  Negative for abdominal pain, constipation, diarrhea, nausea and vomiting.   Endocrine: Negative for cold intolerance and heat intolerance.   Genitourinary:  Negative for difficulty urinating.   Musculoskeletal:  Negative for arthralgias, joint swelling and neck pain.   Skin:  Negative for color change and wound.   Neurological:  Negative for dizziness, seizures, syncope, facial asymmetry, speech difficulty, weakness, light-headedness, numbness and headaches.   Hematological:  Negative for adenopathy.   Psychiatric/Behavioral:  Negative for behavioral problems, confusion and sleep disturbance.      Vitals:    05/20/25 1451   BP: 150/74   Pulse: 67   Weight: 64.4 kg (142 lb)      Objective   Physical Exam  Constitutional:       Appearance: Normal appearance.   HENT:      Head: Normocephalic.      Right Ear: External ear normal.      Left Ear: External ear normal.      Nose: Nose normal.      Mouth/Throat:      Mouth: Mucous membranes are moist.   Eyes:      Extraocular Movements: Extraocular movements intact.   Neck:      Vascular: No carotid bruit.   Cardiovascular:      Rate and Rhythm: Normal rate and regular rhythm.      Pulses: Normal pulses.   Pulmonary:      Effort: Pulmonary effort is normal. No respiratory distress.      Breath sounds:  Normal breath sounds.   Abdominal:      Palpations: Abdomen is soft.      Tenderness: There is no abdominal tenderness.   Musculoskeletal:         General: No swelling or tenderness.      Cervical back: Normal range of motion and neck supple.      Right lower leg: No edema.      Left lower leg: No edema.   Skin:     General: Skin is warm and dry.      Capillary Refill: Capillary refill takes less than 2 seconds.      Findings: No lesion.   Neurological:      General: No focal deficit present.      Mental Status: She is alert and oriented to person, place, and time. Mental status is at baseline.   Psychiatric:         Mood and Affect: Mood normal.         Behavior: Behavior normal.         Thought Content: Thought content normal.         Judgment: Judgment normal.         Assessment/Plan   Problem List Items Addressed This Visit    None  Visit Diagnoses         Codes      Bilateral carotid artery stenosis    -  Primary I65.23    Relevant Orders    Vascular US Carotid Artery Duplex Bilateral (Completed)    Vascular US Carotid Artery Duplex Bilateral        79 year old female presents for annual follow up visit for carotid stenosis. General health has been good over the past 1 year. No TIA/CVA. No amaurosis fugax. Ambulatory no lower extremity claudication or swelling. No complaints today.,   -reviewed and discussed carotid duplex from 5/8/2025, minimal disease bilaterally, asymptomatic  -continue current medication regimen, antiplatelet and statin  -signs and symptoms of TIA/CVA and indications to seek urgent medical attention reviewed  -repeat carotid duplex 1year  -follow up 1 year  -call with questions or concerns          Autumn Noel PA-C 05/20/25 3:07 PM

## 2025-05-21 LAB
ALBUMIN SERPL-MCNC: 4.6 G/DL (ref 3.6–5.1)
ALBUMIN/CREAT UR: 8 MG/G CREAT
ALP SERPL-CCNC: 64 U/L (ref 37–153)
ALT SERPL-CCNC: 23 U/L (ref 6–29)
ANION GAP SERPL CALCULATED.4IONS-SCNC: 9 MMOL/L (CALC) (ref 7–17)
AST SERPL-CCNC: 25 U/L (ref 10–35)
BILIRUB SERPL-MCNC: 1.2 MG/DL (ref 0.2–1.2)
BUN SERPL-MCNC: 18 MG/DL (ref 7–25)
CALCIUM SERPL-MCNC: 9.7 MG/DL (ref 8.6–10.4)
CHLORIDE SERPL-SCNC: 100 MMOL/L (ref 98–110)
CHOLEST SERPL-MCNC: 133 MG/DL
CHOLEST/HDLC SERPL: 2.8 (CALC)
CO2 SERPL-SCNC: 27 MMOL/L (ref 20–32)
CREAT SERPL-MCNC: 0.89 MG/DL (ref 0.6–0.95)
CREAT UR-MCNC: 153 MG/DL (ref 20–275)
EGFRCR SERPLBLD CKD-EPI 2021: 65 ML/MIN/1.73M2
EST. AVERAGE GLUCOSE BLD GHB EST-MCNC: 186 MG/DL
EST. AVERAGE GLUCOSE BLD GHB EST-SCNC: 10.3 MMOL/L
GLUCOSE SERPL-MCNC: 154 MG/DL (ref 65–99)
HBA1C MFR BLD: 8.1 %
HDLC SERPL-MCNC: 47 MG/DL
LDLC SERPL CALC-MCNC: 65 MG/DL (CALC)
MICROALBUMIN UR-MCNC: 1.3 MG/DL
NONHDLC SERPL-MCNC: 86 MG/DL (CALC)
POTASSIUM SERPL-SCNC: 3.5 MMOL/L (ref 3.5–5.3)
PROT SERPL-MCNC: 7.4 G/DL (ref 6.1–8.1)
SODIUM SERPL-SCNC: 136 MMOL/L (ref 135–146)
T4 FREE SERPL-MCNC: 1 NG/DL (ref 0.8–1.8)
TRIGL SERPL-MCNC: 125 MG/DL
TSH SERPL-ACNC: 5.58 MIU/L (ref 0.4–4.5)

## 2025-05-23 ENCOUNTER — APPOINTMENT (OUTPATIENT)
Dept: PRIMARY CARE | Facility: CLINIC | Age: 80
End: 2025-05-23
Payer: MEDICARE

## 2025-05-23 VITALS
WEIGHT: 142.6 LBS | SYSTOLIC BLOOD PRESSURE: 123 MMHG | RESPIRATION RATE: 14 BRPM | BODY MASS INDEX: 26.24 KG/M2 | HEIGHT: 62 IN | DIASTOLIC BLOOD PRESSURE: 61 MMHG | HEART RATE: 66 BPM

## 2025-05-23 DIAGNOSIS — E11.649 UNCONTROLLED TYPE 2 DIABETES MELLITUS WITH HYPOGLYCEMIA WITHOUT COMA: Primary | ICD-10-CM

## 2025-05-23 DIAGNOSIS — I10 ESSENTIAL (PRIMARY) HYPERTENSION: ICD-10-CM

## 2025-05-23 DIAGNOSIS — M81.0 AGE-RELATED OSTEOPOROSIS WITHOUT CURRENT PATHOLOGICAL FRACTURE: ICD-10-CM

## 2025-05-23 DIAGNOSIS — L98.9 SKIN LESION OF FACE: ICD-10-CM

## 2025-05-23 DIAGNOSIS — Z00.00 ROUTINE MEDICAL EXAM: ICD-10-CM

## 2025-05-23 DIAGNOSIS — E11.9 DIABETES MELLITUS WITHOUT COMPLICATION: ICD-10-CM

## 2025-05-23 DIAGNOSIS — E03.9 ACQUIRED HYPOTHYROIDISM: ICD-10-CM

## 2025-05-23 RX ORDER — LOSARTAN POTASSIUM 100 MG/1
100 TABLET ORAL DAILY
Qty: 90 TABLET | Refills: 3 | Status: SHIPPED | OUTPATIENT
Start: 2025-05-23 | End: 2026-05-23

## 2025-05-23 ASSESSMENT — ACTIVITIES OF DAILY LIVING (ADL)
DOING_HOUSEWORK: INDEPENDENT
GROCERY_SHOPPING: INDEPENDENT
TAKING_MEDICATION: INDEPENDENT
BATHING: INDEPENDENT
DRESSING: INDEPENDENT
MANAGING_FINANCES: INDEPENDENT

## 2025-05-23 ASSESSMENT — PATIENT HEALTH QUESTIONNAIRE - PHQ9
SUM OF ALL RESPONSES TO PHQ9 QUESTIONS 1 AND 2: 0
1. LITTLE INTEREST OR PLEASURE IN DOING THINGS: NOT AT ALL
2. FEELING DOWN, DEPRESSED OR HOPELESS: NOT AT ALL

## 2025-05-23 NOTE — PROGRESS NOTES
"Subjective   Patient ID: Alda Avila is a 79 y.o. female who presents for Medicare Annual Wellness Visit Subsequent (fu).    HPI   rt nose skin lesion for a few mos. No bleeding or pain. Recent A1c was at 8.1. No polydipsia, polyuria or significant weight changes. No lower extremities skin lesion. No LE numbness or tingling. No blurry vision.  No CP, HA, dizziness, heart palpitation. No claudication or cold LE.  No LE edema. No imbalance or falls. Good mood.     Review of Systems    Objective   /61   Pulse 66   Resp 14   Ht 1.575 m (5' 2\")   Wt 64.7 kg (142 lb 9.6 oz)   BMI 26.08 kg/m²     Physical Exam  NAD, well groomed, No sclera icterus.  lungs: CTA b/l, heart: RRR, No LE edema, normal pedal pulses, abd: soft, no tenderness, BS+, normal strength and sensation  at bilateral lower extremities. No skin lesions at bilateral feet.   there was a 0.4 x0.3 cm brown skin lesion at rt nose bridge, no tenderness or bleeding, Good balance. CNII-XII were grossly intact, good judgment and memory. No depressed mood.      Assessment/Plan   Assessment & Plan  Uncontrolled type 2 diabetes mellitus with hypoglycemia without coma  DMII, not  controlled. Pt declined to start dmii  medications. Will monitor A1C, urine albumin. advise eye exam by an OD yearly and checking bilateral feet for skin lesions qhs. Healthy diet and regular exercise. Decrease calorie intake to lose wt.           Age-related osteoporosis without current pathological fracture  Pt declined to try fosamax. Cont calcium and vit d supplement and wt bearing exercise       Skin lesion of face  Recommend biopsy for evaluation. Pt agreed to the procedure. Under sterile condition with betadine and 1 ml 1% lidocaine anesthesia, the mole was biopsied  and sent to pathology. The wound was covered with sterile gauze. Advise pt to keep area clean and dry. We will notify the pt of the pathology result of the skin biopsy.    Orders:    Dermatopathology- DERM " LAB    Routine medical exam [Z00.00]  Medicare wellness visit: pt was capable of performing all ADLs and IADLs. Pt has good memory and cognitive function. pt is overweight, Recommend healthy diet and regular exercise.  Advise eye exam by an OD yearly for glaucoma screen and dental exam every 6 months. check lipids.   will monitor blood pressure, cholesterol levels and weight regularly. Recommend sunscreen application if exposed to the sun when the UV index is over 2. Recommend vaccine shots as indicated in the patient's Care Gaps in Epic.   Recommend pt to clear clutters on floor at home to prevent falls. recommend to update living will and DPOA  pt  had been taking all current  medications as prescribed.

## 2025-05-23 NOTE — ASSESSMENT & PLAN NOTE
DMII, not  controlled. Pt declined to start dmii  medications. Will monitor A1C, urine albumin. advise eye exam by an OD yearly and checking bilateral feet for skin lesions qhs. Healthy diet and regular exercise. Decrease calorie intake to lose wt.

## 2025-08-22 ENCOUNTER — APPOINTMENT (OUTPATIENT)
Dept: PRIMARY CARE | Facility: CLINIC | Age: 80
End: 2025-08-22
Payer: MEDICARE

## 2025-08-27 LAB
ALBUMIN SERPL-MCNC: 4.6 G/DL (ref 3.6–5.1)
ALBUMIN/CREAT UR: 4 MG/G CREAT
ALP SERPL-CCNC: 63 U/L (ref 37–153)
ALT SERPL-CCNC: 21 U/L (ref 6–29)
ANION GAP SERPL CALCULATED.4IONS-SCNC: 8 MMOL/L (CALC) (ref 7–17)
AST SERPL-CCNC: 20 U/L (ref 10–35)
BILIRUB SERPL-MCNC: 0.7 MG/DL (ref 0.2–1.2)
BUN SERPL-MCNC: 19 MG/DL (ref 7–25)
CALCIUM SERPL-MCNC: 10.4 MG/DL (ref 8.6–10.4)
CHLORIDE SERPL-SCNC: 102 MMOL/L (ref 98–110)
CO2 SERPL-SCNC: 29 MMOL/L (ref 20–32)
CREAT SERPL-MCNC: 0.82 MG/DL (ref 0.6–0.95)
CREAT UR-MCNC: 80 MG/DL (ref 20–275)
EGFRCR SERPLBLD CKD-EPI 2021: 71 ML/MIN/1.73M2
EST. AVERAGE GLUCOSE BLD GHB EST-MCNC: 160 MG/DL
EST. AVERAGE GLUCOSE BLD GHB EST-SCNC: 8.9 MMOL/L
GLUCOSE SERPL-MCNC: 100 MG/DL (ref 65–139)
HBA1C MFR BLD: 7.2 %
MICROALBUMIN UR-MCNC: 0.3 MG/DL
POTASSIUM SERPL-SCNC: 4.6 MMOL/L (ref 3.5–5.3)
PROT SERPL-MCNC: 7.6 G/DL (ref 6.1–8.1)
SODIUM SERPL-SCNC: 139 MMOL/L (ref 135–146)
TSH SERPL-ACNC: 2.5 MIU/L (ref 0.4–4.5)

## 2025-08-29 ENCOUNTER — APPOINTMENT (OUTPATIENT)
Dept: PRIMARY CARE | Facility: CLINIC | Age: 80
End: 2025-08-29
Payer: MEDICARE

## 2025-08-29 VITALS — HEART RATE: 82 BPM | DIASTOLIC BLOOD PRESSURE: 65 MMHG | SYSTOLIC BLOOD PRESSURE: 115 MMHG

## 2025-08-29 DIAGNOSIS — E78.00 ELEVATED LDL CHOLESTEROL LEVEL: Primary | ICD-10-CM

## 2025-08-29 DIAGNOSIS — E11.9 DIABETES MELLITUS WITHOUT COMPLICATION: ICD-10-CM

## 2025-08-29 DIAGNOSIS — I10 ESSENTIAL (PRIMARY) HYPERTENSION: ICD-10-CM

## 2025-08-29 PROBLEM — E11.649 UNCONTROLLED TYPE 2 DIABETES MELLITUS WITH HYPOGLYCEMIA WITHOUT COMA: Status: RESOLVED | Noted: 2025-05-23 | Resolved: 2025-08-29

## 2025-08-29 PROCEDURE — G2211 COMPLEX E/M VISIT ADD ON: HCPCS | Performed by: FAMILY MEDICINE

## 2025-08-29 PROCEDURE — 1160F RVW MEDS BY RX/DR IN RCRD: CPT | Performed by: FAMILY MEDICINE

## 2025-08-29 PROCEDURE — 3078F DIAST BP <80 MM HG: CPT | Performed by: FAMILY MEDICINE

## 2025-08-29 PROCEDURE — 99214 OFFICE O/P EST MOD 30 MIN: CPT | Performed by: FAMILY MEDICINE

## 2025-08-29 PROCEDURE — 3074F SYST BP LT 130 MM HG: CPT | Performed by: FAMILY MEDICINE

## 2025-08-29 PROCEDURE — 1159F MED LIST DOCD IN RCRD: CPT | Performed by: FAMILY MEDICINE

## 2025-08-29 PROCEDURE — 1036F TOBACCO NON-USER: CPT | Performed by: FAMILY MEDICINE

## 2025-12-11 ENCOUNTER — APPOINTMENT (OUTPATIENT)
Dept: ENDOCRINOLOGY | Facility: CLINIC | Age: 80
End: 2025-12-11
Payer: MEDICARE

## 2025-12-29 ENCOUNTER — APPOINTMENT (OUTPATIENT)
Dept: ENDOCRINOLOGY | Facility: CLINIC | Age: 80
End: 2025-12-29
Payer: MEDICARE

## 2026-01-07 ENCOUNTER — APPOINTMENT (OUTPATIENT)
Dept: ENDOCRINOLOGY | Facility: CLINIC | Age: 81
End: 2026-01-07
Payer: MEDICARE

## 2026-02-20 ENCOUNTER — APPOINTMENT (OUTPATIENT)
Dept: PRIMARY CARE | Facility: CLINIC | Age: 81
End: 2026-02-20
Payer: MEDICARE